# Patient Record
Sex: FEMALE | Race: WHITE | Employment: OTHER | URBAN - METROPOLITAN AREA
[De-identification: names, ages, dates, MRNs, and addresses within clinical notes are randomized per-mention and may not be internally consistent; named-entity substitution may affect disease eponyms.]

---

## 2017-01-19 ENCOUNTER — OFFICE VISIT (OUTPATIENT)
Dept: ORTHOPEDIC SURGERY | Facility: CLINIC | Age: 82
End: 2017-01-19

## 2017-01-19 VITALS
BODY MASS INDEX: 26.57 KG/M2 | SYSTOLIC BLOOD PRESSURE: 140 MMHG | DIASTOLIC BLOOD PRESSURE: 88 MMHG | TEMPERATURE: 98 F | HEART RATE: 70 BPM | WEIGHT: 150 LBS

## 2017-01-19 DIAGNOSIS — M25.511 RIGHT SHOULDER PAIN, UNSPECIFIED CHRONICITY: ICD-10-CM

## 2017-01-19 DIAGNOSIS — M25.561 PAIN IN BOTH KNEES, UNSPECIFIED CHRONICITY: ICD-10-CM

## 2017-01-19 DIAGNOSIS — M25.562 PAIN IN BOTH KNEES, UNSPECIFIED CHRONICITY: ICD-10-CM

## 2017-01-19 DIAGNOSIS — M75.51 SHOULDER BURSITIS, RIGHT: Primary | ICD-10-CM

## 2017-01-19 DIAGNOSIS — M17.0 PRIMARY OSTEOARTHRITIS OF BOTH KNEES: ICD-10-CM

## 2017-01-19 DIAGNOSIS — S72.111D: ICD-10-CM

## 2017-01-19 DIAGNOSIS — M25.551 PAIN IN RIGHT HIP: ICD-10-CM

## 2017-01-19 DIAGNOSIS — M18.0 PRIMARY OSTEOARTHRITIS OF BOTH FIRST CARPOMETACARPAL JOINTS: ICD-10-CM

## 2017-01-19 DIAGNOSIS — M54.50 BILATERAL LOW BACK PAIN WITHOUT SCIATICA, UNSPECIFIED CHRONICITY: ICD-10-CM

## 2017-01-19 RX ORDER — BUPIVACAINE HYDROCHLORIDE 2.5 MG/ML
4 INJECTION, SOLUTION EPIDURAL; INFILTRATION; INTRACAUDAL ONCE
Qty: 4 ML | Refills: 0
Start: 2017-01-19 | End: 2017-01-19

## 2017-01-19 RX ORDER — BETAMETHASONE SODIUM PHOSPHATE AND BETAMETHASONE ACETATE 3; 3 MG/ML; MG/ML
3 INJECTION, SUSPENSION INTRA-ARTICULAR; INTRALESIONAL; INTRAMUSCULAR; SOFT TISSUE ONCE
Qty: 0.5 ML | Refills: 0
Start: 2017-01-19 | End: 2017-01-19

## 2017-01-19 RX ORDER — BETAMETHASONE SODIUM PHOSPHATE AND BETAMETHASONE ACETATE 3; 3 MG/ML; MG/ML
6 INJECTION, SUSPENSION INTRA-ARTICULAR; INTRALESIONAL; INTRAMUSCULAR; SOFT TISSUE ONCE
Qty: 1 ML | Refills: 0
Start: 2017-01-19 | End: 2017-01-19

## 2017-01-19 RX ORDER — BUPIVACAINE HYDROCHLORIDE 2.5 MG/ML
8 INJECTION, SOLUTION EPIDURAL; INFILTRATION; INTRACAUDAL ONCE
Qty: 8 ML | Refills: 0
Start: 2017-01-19 | End: 2017-01-19

## 2017-01-19 NOTE — PATIENT INSTRUCTIONS
Knee Arthritis: Exercises  Your Care Instructions  Here are some examples of exercises for knee arthritis. Start each exercise slowly. Ease off the exercise if you start to have pain. Your doctor or physical therapist will tell you when you can start these exercises and which ones will work best for you. How to do the exercises  Knee flexion with heel slide    1. Lie on your back with your knees bent. 2. Slide your heel back by bending your affected knee as far as you can. Then hook your other foot around your ankle to help pull your heel even farther back. 3. Hold for about 6 seconds, then rest for up to 10 seconds. 4. Repeat 8 to 12 times. 5. Switch legs and repeat steps 1 through 4, even if only one knee is sore. Quad sets    1. Sit with your affected leg straight and supported on the floor or a firm bed. Place a small, rolled-up towel under your knee. Your other leg should be bent, with that foot flat on the floor. 2. Tighten the thigh muscles of your affected leg by pressing the back of your knee down into the towel. 3. Hold for about 6 seconds, then rest for up to 10 seconds. 4. Repeat 8 to 12 times. 5. Switch legs and repeat steps 1 through 4, even if only one knee is sore. Straight-leg raises to the front    1. Lie on your back with your good knee bent so that your foot rests flat on the floor. Your affected leg should be straight. Make sure that your low back has a normal curve. You should be able to slip your hand in between the floor and the small of your back, with your palm touching the floor and your back touching the back of your hand. 2. Tighten the thigh muscles in your affected leg by pressing the back of your knee flat down to the floor. Hold your knee straight. 3. Keeping the thigh muscles tight and your leg straight, lift your affected leg up so that your heel is about 12 inches off the floor. Hold for about 6 seconds, then lower slowly.   4. Relax for up to 10 seconds between repetitions. 5. Repeat 8 to 12 times. 6. Switch legs and repeat steps 1 through 5, even if only one knee is sore. Active knee flexion    1. Lie on your stomach with your knees straight. If your kneecap is uncomfortable, roll up a washcloth and put it under your leg just above your kneecap. 2. Lift the foot of your affected leg by bending the knee so that you bring the foot up toward your buttock. If this motion hurts, try it without bending your knee quite as far. This may help you avoid any painful motion. 3. Slowly move your leg up and down. 4. Repeat 8 to 12 times. 5. Switch legs and repeat steps 1 through 4, even if only one knee is sore. Quadriceps stretch (facedown)    1. Lie flat on your stomach, and rest your face on the floor. 2. Wrap a towel or belt strap around the lower part of your affected leg. Then use the towel or belt strap to slowly pull your heel toward your buttock until you feel a stretch. 3. Hold for about 15 to 30 seconds, then relax your leg against the towel or belt strap. 4. Repeat 2 to 4 times. 5. Switch legs and repeat steps 1 through 4, even if only one knee is sore. Stationary exercise bike    If you do not have a stationary exercise bike at home, you can find one to ride at your local health club or community center. 1. Adjust the height of the bike seat so that your knee is slightly bent when your leg is extended downward. If your knee hurts when the pedal reaches the top, you can raise the seat so that your knee does not bend as much. 2. Start slowly. At first, try to do 5 to 10 minutes of cycling with little to no resistance. Then increase your time and the resistance bit by bit until you can do 20 to 30 minutes without pain. 3. If you start to have pain, rest your knee until your pain gets back to the level that is normal for you. Or cycle for less time or with less effort. Follow-up care is a key part of your treatment and safety.  Be sure to make and go to all appointments, and call your doctor if you are having problems. It's also a good idea to know your test results and keep a list of the medicines you take. Where can you learn more? Go to http://gautam-ruthie.info/. Enter C159 in the search box to learn more about \"Knee Arthritis: Exercises. \"  Current as of: May 23, 2016  Content Version: 11.1  © 2006-2016 [employer]. Care instructions adapted under license by [employer] (which disclaims liability or warranty for this information). If you have questions about a medical condition or this instruction, always ask your healthcare professional. [place] [address] any warranty or liability for your use of this information. Joint Injections: Care Instructions  Your Care Instructions  Joint injections are shots into a joint, such as the knee. They may be used to put in medicines, such as pain relievers. Or they can be used to take out fluid. Sometimes the fluid is tested in a lab. This can help find the cause of a joint problem. A corticosteroid, or steroid, shot is used to reduce inflammation in tendons or joints. It is often used to treat problems such as arthritis, tendinitis, and bursitis. Steroids can be injected directly into a painful, inflamed joint. They can also help reduce inflammation of a bursa. A bursa is a sac of fluid. It cushions and lubricates areas where tendons, ligaments, skin, muscles, or bones rub against each other. A steroid shot can sometimes help with short-term pain relief when other treatments haven't worked. If steroid shots help, pain may improve for weeks or months. Follow-up care is a key part of your treatment and safety. Be sure to make and go to all appointments, and call your doctor if you are having problems. It's also a good idea to know your test results and keep a list of the medicines you take. How can you care for yourself at home?   · Put ice or a cold pack on the area for 10 to 20 minutes at a time. Put a thin cloth between the ice and your skin. · Take anti-inflammatory medicines to reduce pain, swelling, or inflammation. These include ibuprofen (Advil, Motrin) and naproxen (Aleve). Read and follow all instructions on the label. · Avoid strenuous activities for several days, especially those that put stress on the area where you got the shot. · If you have dressings over the area, keep them clean and dry. You may remove them when your doctor tells you to. When should you call for help? Call your doctor now or seek immediate medical care if:  · You have signs of infection, such as:  ¨ Increased pain, swelling, warmth, or redness. ¨ Red streaks leading from the site. ¨ Pus draining from the site. ¨ A fever. Watch closely for changes in your health, and be sure to contact your doctor if you have any problems. Where can you learn more? Go to http://gautam-ruthie.info/. Enter N616 in the search box to learn more about \"Joint Injections: Care Instructions. \"  Current as of: May 23, 2016  Content Version: 11.1  © 6477-0634 [employer] Care instructions adapted under license by [employer] (which disclaims liability or warranty for this information). If you have questions about a medical condition or this instruction, always ask your healthcare professional. [address] any warranty or liability for your use of this information.

## 2017-01-19 NOTE — PROGRESS NOTES
Patient: Prosper Carmen                MRN: [id number]       SSN: [id number]  YOB: 1933        AGE: 80 y.o. SEX: female    PCP: Santy Shultz MD  01/19/17    Chief Complaint   Patient presents with    Knee Pain     Milo    Shoulder Pain     Right     HISTORY:  Prosper Carmen is a 80 y.o. female who is seen for bilateral knee, bilateral hand, and bilateral shoulder pain. Her pains increased recently since her recent mini strokes. She is still recovering from her L humeral neck fx pinning by Dr. Fortino Polanco. She states she ruined herself while walking to a shoe store while shopping in the mall. She has difficulty walking, standing, and climbing stairs. She suffered a laceration on the side of her right calf when she cut it in her bathroom while attempting to kill a cricket on 2/27/16. She had to call 911 after the injury and her  was at the [address] [place] [address] and was not answering his phone. She states she got the \"business\" when she went to [facility]. She received a tetanus shot in her left arm. She is still recovering from her right trochanteric fracture. She underwent pinning of a left humeral neck fracture by Dr. Fortino Polanco on 7/22/16. She reports increased numbness and tingling in her hands. She was previously seen for right hip pain. She fell in [place] at a wedding injuring her right hip 3/10/16. She had a cane that matched her outfit and walked down the aisle. She then went to a different location and was using her walker. She tripped with the walker while walking across the room. She had to be helped up by several young men after falling. She reports bruising on her knees from the fall. She is also experiencing bilateral thumb pain. She has a h/o severe basal joint OA bilateral. She responded to previous cortisone injections. She also has chronic low back pain. Occupation, etc:  Ms. Laine Lacy does not sleep well due to her pains.   She states that she noted pain recently when she bent over while having lunch. She has lived in the [address] [place] [address] for 16 days. Her  is Buddy Arana. She says that her stay there has been great so far, and that it is like going from hell into heaven. She says that her sister, Malia Rowe, is now staying at [facility] on [address]. She currently lives at [address] [place] [address] after her suspected mini strokes in July of 2016. She is currently being fed through a gastrostomy tube. Last 3 Recorded Weights in this Encounter    01/19/17 1057   Weight: 150 lb (68 kg)     Body mass index is 26.57 kg/(m^2). Patient Active Problem List   Diagnosis Code    Reflux esophagitis K21.0    Dysphasia R47.02    Vertigo R42    Generalized osteoarthrosis, involving multiple sites M15.9    Urinary incontinence R32    Bipolar 2 disorder ([facility] [address] F31.81    Hyperlipidemia LDL goal < 130 E78.5    Advance directive discussed with patient Z71.89    Bilateral shoulder bursitis M75.51, M75.52    Osteoarthritis of both knees M17.0    Trochanteric bursitis of left hip M70.62    Right hip pain M25.551    Fracture of greater trochanter of right femur ([facility] [address] S72.111A    Dementia F03.90    Fracture, humerus closed S42.309A     REVIEW OF SYSTEMS: All Below are Negative except: See HPI   Constitutional: negative for fever, chills, and weight loss. Cardiovascular: negative for chest pain, claudication, leg swelling, SOB, AHMADI   Gastrointestinal: Negative for pain, N/V/C/D, Blood in stool or urine, dysuria,  hematuria, incontinence, pelvic pain. Musculoskeletal: See HPI   Neurological: Negative for dizziness and weakness. Negative for headaches, Visual changes, confusion, seizures   Phychiatric/Behavioral: Negative for depression, memory loss, substance  abuse. Extremities: Negative for hair changes, rash, or skin lesion changes.    Hematologic: Negative for bleeding problems, bruising, pallor or swollen lymph  nodes   Peripheral Vascular: No calf pain, no circulation deficits. Social History     Social History    Marital status:      Spouse name: N/A    Number of children: N/A    Years of education: N/A     Occupational History    Not on file. Social History Main Topics    Smoking status: Former Smoker     Quit date: 2/13/2006    Smokeless tobacco: Never Used    Alcohol use No    Drug use: No    Sexual activity: Not on file     Other Topics Concern    Not on file     Social History Narrative     Allergies   Allergen Reactions    Celebrex [Celecoxib] Itching    Codeine Swelling    Sulfa (Sulfonamide Antibiotics) Swelling    Tape [Adhesive] Itching     Current Outpatient Prescriptions   Medication Sig    sennosides (SENNA) 8.8 mg/5 mL syrup Take 5 mL by mouth two (2) times a day.  multivitamin, tx-iron-ca-min (THERA-M W/ IRON) 9 mg iron-400 mcg tab tablet Take 1 Tab by mouth daily.  acetaminophen (TYLENOL) 325 mg tablet 2 Tabs by Per G Tube route every six (6) hours as needed. Indications: PAIN    docusate sodium (COLACE) 100 mg capsule 1 Cap by Per G Tube route two (2) times a day.  pantoprazole (PROTONIX) 40 mg granules for oral suspension 40 mg by PEG Tube route Daily (before breakfast).  oxybutynin (DITROPAN) 5 mg/5 mL syrup 5 mL by Per G Tube route two (2) times a day.  calcium-cholecalciferol, d3, (CALCIUM 600 + D) 600-125 mg-unit tab Take  by mouth.  lidocaine 5 % topical cream Apply  to affected area two (2) times daily as needed for Itching.  HYDROmorphone (DILAUDID) 2 mg tablet 0.5 Tabs by Per G Tube route every four (4) hours as needed. Max Daily Amount: 6 mg.  lamoTRIgine (LAMICTAL) 25 mg tablet 1 Tab by Per G Tube route nightly.  bisacodyl (DULCOLAX) 10 mg suppository Insert 10 mg into rectum daily as needed.  LORazepam (ATIVAN) 0.5 mg tablet 1 Tab by Per G Tube route every twelve (12) hours as needed. Max Daily Amount: 1 mg.  Indications: ANXIETY    OLANZapine (ZYPREXA) 15 mg tablet 1 Tab by Per G Tube route nightly.  polyethylene glycol (MIRALAX) 17 gram packet 1 Packet by Per G Tube route daily as needed.  OTHER Check CBC, CMP, mg in 5 days. Results to PCP immediately     No current facility-administered medications for this visit.       PHYSICAL EXAMINATION:  Visit Vitals    /88 (BP 1 Location: Left arm, BP Patient Position: Sitting)    Pulse 70    Temp 98 °F (36.7 °C) (Oral)    Wt 150 lb (68 kg)    BMI 26.57 kg/m2     ORTHO EXAMINATION:  Examination Right shoulder Left shoulder   Skin Intact Intact   Effusion - -   Biceps deformity - -   Atrophy - -   AC joint tenderness - -   Acromial tenderness + +   Biceps tenderness - -   Forward flexion/Elevation  170   Active abduction  160   External rotation ROM 30 30   Internal rotation ROM 70 70   Apprehension - -   Impingement - -   Drop Arm Test - -   Neurovascular Intact Intact     Examination Left hip Right hip   Skin Intact Intact   External Rotation ROM 20 20   Internal Rotation ROM 10 10   Trochanteric tenderness - -   Hip flexion contracture - -   Antalgic gait - -   Trendelenberg sign - -   Lumbar tenderness - -   Straight leg raise - -   Calf tenderness - -   Neurovascular Intact Intact     Examination Left knee Right knee   Skin Intact Intact, laceration of the right calf   Range of motion 120-0 120-0   Effusion - -   Medial joint line tenderness + +   Lateral joint line tenderness + +   Popliteal tenderness - -   Osteophytes palpable - -   Rebeccas - -   Patella crepitus - -   Anterior drawer - -   Lateral laxity - -   Medial laxity - -   Varus deformity - -   Valgus deformity - -   Pretibial edema - -   Calf tenderness - -   Peripatellar tenderness    Examination Left Hand Right Hand   Skin Intact Intact   Deformity - -   Swelling - -   Tenderness +, basal joint +, basal joint   Finger flexion Full Full   Finger extension Full Full   Sensation Normal Normal   Capillary refill Normal Normal Heberden's nodes - -   Dupuytren's - -   Positive crank and grind test    PROCEDURE:  After discussing treatment options, patient's knees and right shoulder were injected with 4 cc Marcaine and 1/2 cc Celestone. Chart reviewed for the following:   Zoila Salcedo MD, have reviewed the History, Physical and updated the Allergic reactions for 1 Fredy Lansdowne performed immediately prior to start of procedure:  Zoila Salcedo MD, have performed the following reviews on Sheri Rosario prior to the start of the procedure:            * Patient was identified by name and date of birth   * Agreement on procedure being performed was verified  * Risks and Benefits explained to the patient  * Procedure site verified and marked as necessary  * Patient was positioned for comfort  * Consent was obtained     Time: 11:18 AM     Date of procedure: 1/19/2017    Procedure performed by:  Verónica Winter MD    Ms. Arana tolerated the procedure well with no complications. RADIOGRAPHS:    XR LEFT SHOULDER 7/22/16  IMPRESSION:  Humeral neck fracture with inferior subluxation of humeral head, potentially secondary to large joint effusion/hemarthrosis. XR LEFT SHOULDER 7/22/16  IMPRESSION:  Comminuted left humeral fracture at the anatomical neck. XR LEFT HUMERUS 7/22/16  IMPRESSION:  Comminuted humerus near the anatomical neck with medial and anterior displacement. XR THORACIC SPINE 7/22/16  IMPRESSION:  1. Compression deformity of the mid thoracic spine. 2. Comminuted fracture of the left humerus. XR RIGHT HIP 3/24/16  IMPRESSION:  Greater trochanter fracture, no joint space narrowing, no osteophytes present. IMPRESSION:      ICD-10-CM ICD-9-CM    1.  Shoulder bursitis, right M75.51 726.10 betamethasone (CELESTONE SOLUSPAN) 6 mg/mL injection      BETAMETHASONE ACETATE & SODIUM PHOSPHATE INJECTION 3 MG EA.      DRAIN/INJECT LARGE JOINT/BURSA      bupivacaine, PF, (MARCAINE, PF,) 0.25 % (2.5 mg/mL) injection   2. Right shoulder pain, unspecified chronicity M25.511 719.41 betamethasone (CELESTONE SOLUSPAN) 6 mg/mL injection      BETAMETHASONE ACETATE & SODIUM PHOSPHATE INJECTION 3 MG EA.      DRAIN/INJECT LARGE JOINT/BURSA      bupivacaine, PF, (MARCAINE, PF,) 0.25 % (2.5 mg/mL) injection   3. Primary osteoarthritis of both knees M17.0 715.16 betamethasone (CELESTONE SOLUSPAN) 6 mg/mL injection      BETAMETHASONE ACETATE & SODIUM PHOSPHATE INJECTION 3 MG EA.      DRAIN/INJECT LARGE JOINT/BURSA      bupivacaine, PF, (MARCAINE, PF,) 0.25 % (2.5 mg/mL) injection   4. Pain in both knees, unspecified chronicity M25.561 719.46 betamethasone (CELESTONE SOLUSPAN) 6 mg/mL injection    M25.562  BETAMETHASONE ACETATE & SODIUM PHOSPHATE INJECTION 3 MG EA.      DRAIN/INJECT LARGE JOINT/BURSA      bupivacaine, PF, (MARCAINE, PF,) 0.25 % (2.5 mg/mL) injection   5. Fracture of greater trochanter of right femur, closed, with routine healing, subsequent encounter S72.111D V54.13    6. Pain in right hip M25.551 719.45    7. Primary osteoarthritis of both first carpometacarpal joints M18.0 715.14    8. Bilateral low back pain without sciatica, unspecified chronicity M54.5 724.2 REFERRAL TO SPINE SURGERY     PLAN:  After discussing treatment options, patient's knees and right shoulder were injected with 4 cc Marcaine and 1/2 cc Celestone. There is no need for surgery at this time. She will follow up as needed. She will follow up at the spine center if back pain continues. She will follow up with Dr. Faheem May for her left shoulder pain.       Scribed by Kimbia ([address]) as dictated by Anthony Youngblood MD

## 2017-08-21 ENCOUNTER — OFFICE VISIT (OUTPATIENT)
Dept: ORTHOPEDIC SURGERY | Facility: CLINIC | Age: 82
End: 2017-08-21

## 2017-08-21 VITALS
HEIGHT: 63 IN | HEART RATE: 66 BPM | TEMPERATURE: 98.1 F | RESPIRATION RATE: 18 BRPM | SYSTOLIC BLOOD PRESSURE: 111 MMHG | DIASTOLIC BLOOD PRESSURE: 49 MMHG | OXYGEN SATURATION: 94 %

## 2017-08-21 DIAGNOSIS — M25.561 CHRONIC PAIN OF BOTH KNEES: ICD-10-CM

## 2017-08-21 DIAGNOSIS — M18.0 PRIMARY OSTEOARTHRITIS OF BOTH FIRST CARPOMETACARPAL JOINTS: ICD-10-CM

## 2017-08-21 DIAGNOSIS — M17.0 PRIMARY OSTEOARTHRITIS OF BOTH KNEES: Primary | ICD-10-CM

## 2017-08-21 DIAGNOSIS — M19.042 PRIMARY OSTEOARTHRITIS OF BOTH HANDS: ICD-10-CM

## 2017-08-21 DIAGNOSIS — G89.29 CHRONIC PAIN OF BOTH KNEES: ICD-10-CM

## 2017-08-21 DIAGNOSIS — M25.562 CHRONIC PAIN OF BOTH KNEES: ICD-10-CM

## 2017-08-21 DIAGNOSIS — M19.041 PRIMARY OSTEOARTHRITIS OF BOTH HANDS: ICD-10-CM

## 2017-08-21 RX ORDER — BETAMETHASONE SODIUM PHOSPHATE AND BETAMETHASONE ACETATE 3; 3 MG/ML; MG/ML
6 INJECTION, SUSPENSION INTRA-ARTICULAR; INTRALESIONAL; INTRAMUSCULAR; SOFT TISSUE ONCE
Qty: 1 ML | Refills: 0 | Status: CANCELLED
Start: 2017-08-21 | End: 2017-08-21

## 2017-08-21 RX ORDER — BUPIVACAINE HYDROCHLORIDE 2.5 MG/ML
8 INJECTION, SOLUTION EPIDURAL; INFILTRATION; INTRACAUDAL ONCE
Qty: 8 ML | Refills: 0 | Status: CANCELLED
Start: 2017-08-21 | End: 2017-08-21

## 2017-08-21 NOTE — PROGRESS NOTES
Patient: Obed Irene                MRN: [id number]       SSN: [id number]  YOB: 1933        AGE: 80 y.o. SEX: female    PCP: Jessica Baeza MD  08/21/17    Chief Complaint   Patient presents with    Knee Pain     Milo    Hand Pain     Milo     HISTORY:  Obed Irene is a 80 y.o. female who is seen for increased bilateral knee and hand pain and stiffness. Her pains increased recently since her recent mini strokes. She is still recovering from her L humeral neck fx pinning by Dr. Dereck Osgood. She states she ruined herself while walking to a shoe store while shopping in the mall. She has difficulty walking, standing, and climbing stairs. She suffered a laceration on the side of her right calf when she cut it in her bathroom while attempting to kill a cricket on 2/27/16. She had to call 911 after the injury and her  was at the [address] [place] [address] and was not answering his phone. She states she got the \"business\" when she went to [facility]. She received a tetanus shot in her left arm. She is still recovering from her right trochanteric fracture. She underwent pinning of a left humeral neck fracture by Dr. Dereck Osgood on 7/22/16. She has a h/o severe basal joint OA bilateral. She responded to previous cortisone injections. She also has chronic low back pain. 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 She was previously seen for right hip, bilateral hand, and bilateral shoulder pain. She fell in [place] at a wedding injuring her right hip 3/10/16. She had a cane that matched her outfit and walked down the aisle. She then went to a different location and was using her walker. She tripped with the walker while walking across the room. She had to be helped up by several young men after falling. She reports bruising on her knees from the fall.  She reports increased numbness and tingling in her hands. She denies any numbness or tingling. Pain Assessment  8/21/2017   Location of Pain Hand   Location Modifiers Left;Right   Severity of Pain 6   Quality of Pain Aching   Duration of Pain Persistent   Frequency of Pain Intermittent   Aggravating Factors Bending;Stretching;Straightening   Limiting Behavior Yes   Relieving Factors Rest   Result of Injury No     Occupation, etc:  Ms. Emerson Pina does not sleep well due to her pains. She states that she noted pain recently when she bent over while having lunch. She has lived in the [address] [place] [address] for 16 days. Her  is Buddy Arana. She says that her stay there has been great so far, and that it is like going from [facility] into [facility]. She says that her sister, Marylen Somerset, is now staying at [facility] on [address]. She currently lives at [address] [place] [address] after her suspected mini strokes in July of 2016. She is currently being fed through a gastrostomy tube. There were no vitals filed for this visit. There is no height or weight on file to calculate BMI. Patient Active Problem List   Diagnosis Code    Reflux esophagitis K21.0    Dysphasia R47.02    Vertigo R42    Generalized osteoarthrosis, involving multiple sites M15.9    Urinary incontinence R32    Bipolar 2 disorder ([address] F31.81    Hyperlipidemia LDL goal < 130 E78.5    Advance directive discussed with patient Z71.89    Bilateral shoulder bursitis M75.51, M75.52    Osteoarthritis of both knees M17.0    Trochanteric bursitis of left hip M70.62    Right hip pain M25.551    Fracture of greater trochanter of right femur ([facility] S72.111A    Dementia F03.90    Fracture, humerus closed S42.309A     REVIEW OF SYSTEMS: All Below are Negative except: See HPI   Constitutional: negative for fever, chills, and weight loss.    Cardiovascular: negative for chest pain, claudication, leg swelling, SOB, AHMADI   Gastrointestinal: Negative for pain, N/V/C/D, Blood in stool or urine, dysuria,  hematuria, incontinence, pelvic pain. Musculoskeletal: See HPI   Neurological: Negative for dizziness and weakness. Negative for headaches, Visual changes, confusion, seizures   Phychiatric/Behavioral: Negative for depression, memory loss, substance  abuse. Extremities: Negative for hair changes, rash, or skin lesion changes. Hematologic: Negative for bleeding problems, bruising, pallor or swollen lymph  nodes   Peripheral Vascular: No calf pain, no circulation deficits. Social History     Social History    Marital status:      Spouse name: N/A    Number of children: N/A    Years of education: N/A     Occupational History    Not on file. Social History Main Topics    Smoking status: Former Smoker     Quit date: 2/13/2006    Smokeless tobacco: Never Used    Alcohol use No    Drug use: No    Sexual activity: Not on file     Other Topics Concern    Not on file     Social History Narrative     Allergies   Allergen Reactions    Celebrex [Celecoxib] Itching    Codeine Swelling    Sulfa (Sulfonamide Antibiotics) Swelling    Tape [Adhesive] Itching     Current Outpatient Prescriptions   Medication Sig    calcium-cholecalciferol, d3, (CALCIUM 600 + D) 600-125 mg-unit tab Take  by mouth.  sennosides (SENNA) 8.8 mg/5 mL syrup Take 5 mL by mouth two (2) times a day.  multivitamin, tx-iron-ca-min (THERA-M W/ IRON) 9 mg iron-400 mcg tab tablet Take 1 Tab by mouth daily.  lidocaine 5 % topical cream Apply  to affected area two (2) times daily as needed for Itching.  acetaminophen (TYLENOL) 325 mg tablet 2 Tabs by Per G Tube route every six (6) hours as needed. Indications: PAIN    lamoTRIgine (LAMICTAL) 25 mg tablet 1 Tab by Per G Tube route nightly.  bisacodyl (DULCOLAX) 10 mg suppository Insert 10 mg into rectum daily as needed.  docusate sodium (COLACE) 100 mg capsule 1 Cap by Per G Tube route two (2) times a day.     LORazepam (ATIVAN) 0.5 mg tablet 1 Tab by Per G Tube route every twelve (12) hours as needed. Max Daily Amount: 1 mg. Indications: ANXIETY    OLANZapine (ZYPREXA) 15 mg tablet 1 Tab by Per G Tube route nightly.  polyethylene glycol (MIRALAX) 17 gram packet 1 Packet by Per G Tube route daily as needed.  pantoprazole (PROTONIX) 40 mg granules for oral suspension 40 mg by PEG Tube route Daily (before breakfast).  oxybutynin (DITROPAN) 5 mg/5 mL syrup 5 mL by Per G Tube route two (2) times a day.  HYDROmorphone (DILAUDID) 2 mg tablet 0.5 Tabs by Per G Tube route every four (4) hours as needed. Max Daily Amount: 6 mg.    OTHER Check CBC, CMP, mg in 5 days. Results to PCP immediately     No current facility-administered medications for this visit.       PHYSICAL EXAMINATION:  Visit Vitals    /49    Pulse 66    Temp 98.1 °F (36.7 °C) (Oral)    Resp 18    Ht 5' 3\" (1.6 m)    SpO2 94%     ORTHO EXAMINATION:  Examination Right shoulder Left shoulder   Skin Intact Intact   Effusion - -   Biceps deformity - -   Atrophy - -   AC joint tenderness - -   Acromial tenderness + +   Biceps tenderness - -   Forward flexion/Elevation  170   Active abduction  160   External rotation ROM 30 30   Internal rotation ROM 70 70   Apprehension - -   Impingement - -   Drop Arm Test - -   Neurovascular Intact Intact     Examination Left hip Right hip   Skin Intact Intact   External Rotation ROM 20 20   Internal Rotation ROM 10 10   Trochanteric tenderness - -   Hip flexion contracture - -   Antalgic gait - -   Trendelenberg sign - -   Lumbar tenderness - -   Straight leg raise - -   Calf tenderness - -   Neurovascular Intact Intact     Examination Left knee Right knee   Skin Intact Intact, laceration of the right calf   Range of motion 120-0 120-0   Effusion - -   Medial joint line tenderness + +   Lateral joint line tenderness + +   Popliteal tenderness - -   Osteophytes palpable - -   Rebeccas - -   Patella crepitus - -   Anterior drawer - -   Lateral laxity - -   Medial laxity - -   Varus deformity - -   Valgus deformity - -   Pretibial edema - -   Calf tenderness - -   Peripatellar tenderness    Examination Left Hand Right Hand   Skin Intact Intact   Deformity - -   Swelling - -   Tenderness +, basal joint +, basal joint   Finger flexion Full Full   Finger extension Full Full   Sensation Normal Normal   Capillary refill Normal Normal   Heberden's nodes + +   Dupuytren's - -   Positive crank and grind test  10 degree PIP contracture right  20 degree MCP contracture bilateral     RADIOGRAPHS:    XR LEFT SHOULDER 7/22/16  IMPRESSION:  Humeral neck fracture with inferior subluxation of humeral head, potentially secondary to large joint effusion/hemarthrosis. XR LEFT SHOULDER 7/22/16  IMPRESSION:  Comminuted left humeral fracture at the anatomical neck. XR LEFT HUMERUS 7/22/16  IMPRESSION:  Comminuted humerus near the anatomical neck with medial and anterior displacement. XR THORACIC SPINE 7/22/16  IMPRESSION:  1. Compression deformity of the mid thoracic spine. 2. Comminuted fracture of the left humerus. XR RIGHT HIP 3/24/16  IMPRESSION:  Greater trochanter fracture, no joint space narrowing, no osteophytes present. IMPRESSION:      ICD-10-CM ICD-9-CM    1. Primary osteoarthritis of both knees M17.0 715.16 REFERRAL TO OCCUPATIONAL THERAPY   2. Chronic pain of both knees M25.561 719.46     M25.562 338.29     G89.29     3. Primary osteoarthritis of both first carpometacarpal joints M18.0 715.14    4. Primary osteoarthritis of both hands M19.041 715.14 REFERRAL TO OCCUPATIONAL THERAPY    M19.042       PLAN:  There is no need for surgery at this time. She will follow up as needed. She will follow up at the spine center if back pain continues. She will follow up with Dr. Coco Sal for her left shoulder pain. She will begin hand therapy at [facility].      Scribed by Manuel Watson ([address]) as dictated by Mata Matamoros MD

## 2017-08-21 NOTE — PATIENT INSTRUCTIONS
Hand Arthritis: Exercises  Your Care Instructions  Here are some examples of exercises for hand arthritis. Start each exercise slowly. Ease off the exercise if you start to have pain. Your doctor or your physical or occupational therapist will tell you when you can start these exercises and which ones will work best for you. How to do the exercises  Tendon gliyong    In this exercise, the steps follow one another to a make a continuous movement. 1. With your affected hand, point your fingers and thumb straight up. Your wrist should be relaxed, following the line of your fingers and thumb. 2. Curl your fingers so that the top two joints in them are bent, and your fingers wrap down. Your fingertips should touch or be near the base of your fingers. Your fingers will look like a hook. 3. Make a fist by bending your knuckles. Your thumb can gently rest against your index (pointing) finger. 4. Unwind your fingers slightly so that your fingertips can touch the base of your palm. Your thumb can rest against your index finger. 5. Move back to your starting position, with your fingers and thumb pointing up. 6. Repeat the series of motions 8 to 12 times. 7. Switch hands and repeat steps 1 through 6, even if only one hand is sore. Intrinsic flexion    1. Rest your affected hand on a table and bend the large joints where your fingers connect to your hand. Keep your thumb and the other joints in your fingers straight. 2. Slowly straighten your fingers. Your wrist should be relaxed, following the line of your fingers and thumb. 3. Move back to your starting position, with your hand bent. 4. Repeat 8 to 12 times. 5. Switch hands and repeat steps 1 through 4, even if only one hand is sore. Finger extension    1. Place your affected hand flat on a table. 2. Lift and then lower one finger at a time off the table. 3. Repeat 8 to 12 times.   4. Switch hands and repeat steps 1 through 3, even if only one hand is sore.  MP extension    1. Place your good hand on a table, palm up. Put your affected hand on top of your good hand with your fingers wrapped around the thumb of your good hand like you are making a fist.  2. Slowly uncurl the joints of your affected hand where your fingers connect to your hand so that only the top two joints of your fingers are bent. Your fingers will look like a hook. 3. Move back to your starting position, with your fingers wrapped around your good thumb. 4. Repeat 8 to 12 times. 5. Switch hands and repeat steps 1 through 4, even if only one hand is sore. PIP extension (with MP extension)    1. Place your good hand on a table, palm up. Put your affected hand on top of your good hand, palm up. 2. Use the thumb and fingers of your good hand to grasp below the middle joint of one finger of your affected hand. 3. Straighten the last two joints of that finger. 4. Repeat 8 to 12 times. 5. Repeat steps 1 through 4 with each finger. 6. Switch hands and repeat steps 1 through 5, even if only one hand is sore. DIP flexion    1. With your good hand, grasp one finger of your affected hand. Your thumb will be on the top side of your finger just below the joint that is closest to your fingernail. 2. Slowly bend your affected finger only at the joint closest to your fingernail. 3. Repeat 8 to 12 times. 4. Repeat steps 1 through 3 with each finger. 5. Switch hands and repeat steps 1 through 4, even if only one hand is sore. Follow-up care is a key part of your treatment and safety. Be sure to make and go to all appointments, and call your doctor if you are having problems. It's also a good idea to know your test results and keep a list of the medicines you take. Where can you learn more? Go to http://gautam-ruthie.info/. Enter C698 in the search box to learn more about \"Hand Arthritis: Exercises. \"  Current as of: March 21, 2017  Content Version: 11.3  © 7657-3406 [employer]. Care instructions adapted under license by [employer] (which disclaims liability or warranty for this information). If you have questions about a medical condition or this instruction, always ask your healthcare professional. [address] any warranty or liability for your use of this information.

## 2018-03-13 ENCOUNTER — OFFICE VISIT (OUTPATIENT)
Dept: ORTHOPEDIC SURGERY | Facility: CLINIC | Age: 83
End: 2018-03-13

## 2018-03-13 VITALS
OXYGEN SATURATION: 92 % | HEIGHT: 63 IN | DIASTOLIC BLOOD PRESSURE: 61 MMHG | TEMPERATURE: 97.7 F | RESPIRATION RATE: 18 BRPM | HEART RATE: 69 BPM | SYSTOLIC BLOOD PRESSURE: 128 MMHG

## 2018-03-13 DIAGNOSIS — M54.50 BILATERAL LOW BACK PAIN WITHOUT SCIATICA, UNSPECIFIED CHRONICITY: ICD-10-CM

## 2018-03-13 DIAGNOSIS — M22.42 CHONDROMALACIA OF BOTH PATELLAE: ICD-10-CM

## 2018-03-13 DIAGNOSIS — M25.561 CHRONIC PAIN OF BOTH KNEES: ICD-10-CM

## 2018-03-13 DIAGNOSIS — G89.29 CHRONIC PAIN OF BOTH KNEES: ICD-10-CM

## 2018-03-13 DIAGNOSIS — M22.41 CHONDROMALACIA OF BOTH PATELLAE: ICD-10-CM

## 2018-03-13 DIAGNOSIS — M17.0 PRIMARY OSTEOARTHRITIS OF BOTH KNEES: Primary | ICD-10-CM

## 2018-03-13 DIAGNOSIS — M25.562 CHRONIC PAIN OF BOTH KNEES: ICD-10-CM

## 2018-03-13 RX ORDER — BETAMETHASONE SODIUM PHOSPHATE AND BETAMETHASONE ACETATE 3; 3 MG/ML; MG/ML
6 INJECTION, SUSPENSION INTRA-ARTICULAR; INTRALESIONAL; INTRAMUSCULAR; SOFT TISSUE ONCE
Qty: 0.5 ML | Refills: 0
Start: 2018-03-13 | End: 2018-03-13

## 2018-03-13 RX ORDER — BUPIVACAINE HYDROCHLORIDE 2.5 MG/ML
8 INJECTION, SOLUTION EPIDURAL; INFILTRATION; INTRACAUDAL ONCE
Qty: 8 ML | Refills: 0
Start: 2018-03-13 | End: 2018-03-13

## 2018-03-13 NOTE — PATIENT INSTRUCTIONS
Knee Arthritis: Exercises  Your Care Instructions  Here are some examples of exercises for knee arthritis. Start each exercise slowly. Ease off the exercise if you start to have pain. Your doctor or physical therapist will tell you when you can start these exercises and which ones will work best for you. How to do the exercises  Knee flexion with heel slide    1. Lie on your back with your knees bent. 2. Slide your heel back by bending your affected knee as far as you can. Then hook your other foot around your ankle to help pull your heel even farther back. 3. Hold for about 6 seconds, then rest for up to 10 seconds. 4. Repeat 8 to 12 times. 5. Switch legs and repeat steps 1 through 4, even if only one knee is sore. Quad sets    1. Sit with your affected leg straight and supported on the floor or a firm bed. Place a small, rolled-up towel under your knee. Your other leg should be bent, with that foot flat on the floor. 2. Tighten the thigh muscles of your affected leg by pressing the back of your knee down into the towel. 3. Hold for about 6 seconds, then rest for up to 10 seconds. 4. Repeat 8 to 12 times. 5. Switch legs and repeat steps 1 through 4, even if only one knee is sore. Straight-leg raises to the front    1. Lie on your back with your good knee bent so that your foot rests flat on the floor. Your affected leg should be straight. Make sure that your low back has a normal curve. You should be able to slip your hand in between the floor and the small of your back, with your palm touching the floor and your back touching the back of your hand. 2. Tighten the thigh muscles in your affected leg by pressing the back of your knee flat down to the floor. Hold your knee straight. 3. Keeping the thigh muscles tight and your leg straight, lift your affected leg up so that your heel is about 12 inches off the floor. Hold for about 6 seconds, then lower slowly.   4. Relax for up to 10 seconds between repetitions. 5. Repeat 8 to 12 times. 6. Switch legs and repeat steps 1 through 5, even if only one knee is sore. Active knee flexion    1. Lie on your stomach with your knees straight. If your kneecap is uncomfortable, roll up a washcloth and put it under your leg just above your kneecap. 2. Lift the foot of your affected leg by bending the knee so that you bring the foot up toward your buttock. If this motion hurts, try it without bending your knee quite as far. This may help you avoid any painful motion. 3. Slowly move your leg up and down. 4. Repeat 8 to 12 times. 5. Switch legs and repeat steps 1 through 4, even if only one knee is sore. Quadriceps stretch (facedown)    1. Lie flat on your stomach, and rest your face on the floor. 2. Wrap a towel or belt strap around the lower part of your affected leg. Then use the towel or belt strap to slowly pull your heel toward your buttock until you feel a stretch. 3. Hold for about 15 to 30 seconds, then relax your leg against the towel or belt strap. 4. Repeat 2 to 4 times. 5. Switch legs and repeat steps 1 through 4, even if only one knee is sore. Stationary exercise bike    1. If you do not have a stationary exercise bike at home, you can find one to ride at your local health club or community center. 2. Adjust the height of the bike seat so that your knee is slightly bent when your leg is extended downward. If your knee hurts when the pedal reaches the top, you can raise the seat so that your knee does not bend as much. 3. Start slowly. At first, try to do 5 to 10 minutes of cycling with little to no resistance. Then increase your time and the resistance bit by bit until you can do 20 to 30 minutes without pain. 4. If you start to have pain, rest your knee until your pain gets back to the level that is normal for you. Or cycle for less time or with less effort. Follow-up care is a key part of your treatment and safety.  Be sure to make and go to all appointments, and call your doctor if you are having problems. It's also a good idea to know your test results and keep a list of the medicines you take. Where can you learn more? Go to http://gautam-ruthie.info/. Enter C159 in the search box to learn more about \"Knee Arthritis: Exercises. \"  Current as of: March 21, 2017  Content Version: 11.4  © 2006-2017 [employer]. Care instructions adapted under license by [employer] (which disclaims liability or warranty for this information). If you have questions about a medical condition or this instruction, always ask your healthcare professional. [place] [address] any warranty or liability for your use of this information. Joint Injections: Care Instructions  Your Care Instructions  Joint injections are shots into a joint, such as the knee. They may be used to put in medicines, such as pain relievers. Or they can be used to take out fluid. Sometimes the fluid is tested in a lab. This can help find the cause of a joint problem. A corticosteroid, or steroid, shot is used to reduce inflammation in tendons or joints. It is often used to treat problems such as arthritis, tendinitis, and bursitis. Steroids can be injected directly into a painful, inflamed joint. They can also help reduce inflammation of a bursa. A bursa is a sac of fluid. It cushions and lubricates areas where tendons, ligaments, skin, muscles, or bones rub against each other. A steroid shot can sometimes help with short-term pain relief when other treatments haven't worked. If steroid shots help, pain may improve for weeks or months. Follow-up care is a key part of your treatment and safety. Be sure to make and go to all appointments, and call your doctor if you are having problems. It's also a good idea to know your test results and keep a list of the medicines you take. How can you care for yourself at home?   · Put ice or a cold pack on the area for 10 to 20 minutes at a time. Put a thin cloth between the ice and your skin. · Take anti-inflammatory medicines to reduce pain, swelling, or inflammation. These include ibuprofen (Advil, Motrin) and naproxen (Aleve). Read and follow all instructions on the label. · Avoid strenuous activities for several days, especially those that put stress on the area where you got the shot. · If you have dressings over the area, keep them clean and dry. You may remove them when your doctor tells you to. When should you call for help? Call your doctor now or seek immediate medical care if:  ? · You have signs of infection, such as:  ¨ Increased pain, swelling, warmth, or redness. ¨ Red streaks leading from the site. ¨ Pus draining from the site. ¨ A fever. ? Watch closely for changes in your health, and be sure to contact your doctor if you have any problems. Where can you learn more? Go to http://gautam-ruthie.info/. Enter N616 in the search box to learn more about \"Joint Injections: Care Instructions. \"  Current as of: March 21, 2017  Content Version: 11.4  © 8990-8647 [employer]. Care instructions adapted under license by [employer] (which disclaims liability or warranty for this information). If you have questions about a medical condition or this instruction, always ask your healthcare professional. [place] [address] any warranty or liability for your use of this information.

## 2018-03-13 NOTE — PROGRESS NOTES
Patient: Greg Alaniz                MRN: [id number]       SSN: [id number]  YOB: 1933        AGE: 80 y.o. SEX: female    PCP: Ines Iniguez MD  03/13/18    Chief Complaint   Patient presents with    Knee Pain     Milo     HISTORY:  Grge Alaniz is a 80 y.o. female who is seen for increased bilateral knee and low back pain. She is still receiving physical therapy at [facility]. She states she has even been doing some walking in PT. She states her knees collapse on her when she is walking because of the pain and weakness. Her pains increased since her recent mini strokes. She is still recovering from her L humeral neck fx pinning by Dr. Scott Padilla. She states she ruined herself while walking to a shoe store while shopping in the mall. She has difficulty walking, standing, and climbing stairs. She suffered a laceration on the side of her right calf when she cut it in her bathroom while attempting to kill a cricket on 2/27/16. She received a tetanus shot in her left arm. She is still recovering from her right trochanteric fracture. She underwent pinning of a left humeral neck fracture by Dr. Scott Padilla on 7/22/16. She has a h/o severe basal joint OA bilateral. She responded to previous cortisone injections. She also has chronic low back pain. 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 She was previously seen for right hip, bilateral hand, and bilateral shoulder pain. She fell in [place] at a wedding injuring her right hip 3/10/16. She had a cane that matched her outfit and walked down the aisle. She then went to a different location and was using her walker. She tripped with the walker while walking across the room. She had to be helped up by several young men after falling. She reports bruising on her knees from the fall.  She reports increased numbness and tingling in her hands. She denies any numbness or tingling. Pain Assessment  8/21/2017   Location of Pain Hand   Location Modifiers Left;Right   Severity of Pain 6   Quality of Pain Aching   Duration of Pain Persistent   Frequency of Pain Intermittent   Aggravating Factors Bending;Stretching;Straightening   Limiting Behavior Yes   Relieving Factors Rest   Result of Injury No     Occupation, etc:  Ms. Sendy Aguilera does not sleep well due to her pains. She states that she noted pain recently when she bent over while having lunch. She is currently living at [facility]. Her  is Buddy Arana. She has two adult children- a 59year old son and 43year old daughter. She says that her stay there has been great so far, and that it is [facility] going from [facility] into [facility]. She says that her sister, Hayes Cotto, is now staying at [facility] on [address]. She currently lives at [address] [place] [address] after her suspected mini strokes in July of 2016. She is currently eating three meals a day but still has a gastrostomy tube which will be removed in the near future. There were no vitals filed for this visit. There is no height or weight on file to calculate BMI. Patient Active Problem List   Diagnosis Code    Reflux esophagitis K21.0    Dysphasia R47.02    Vertigo R42    Generalized osteoarthrosis, involving multiple sites M15.9    Urinary incontinence R32    Bipolar 2 disorder ([facility] [address] F31.81    Hyperlipidemia LDL goal < 130 E78.5    Advance directive discussed with patient Z71.89    Bilateral shoulder bursitis M75.51, M75.52    Osteoarthritis of both knees M17.0    Trochanteric bursitis of left hip M70.62    Right hip pain M25.551    Fracture of greater trochanter of right femur ([facility] [address] S72.111A    Dementia F03.90    Fracture, humerus closed S42.309A     REVIEW OF SYSTEMS: All Below are Negative except: See HPI   Constitutional: negative for fever, chills, and weight loss.    Cardiovascular: negative for chest pain, claudication, leg swelling, SOB, AHMADI   Gastrointestinal: Negative for pain, N/V/C/D, Blood in stool or urine, dysuria,  hematuria, incontinence, pelvic pain. Musculoskeletal: See HPI   Neurological: Negative for dizziness and weakness. Negative for headaches, Visual changes, confusion, seizures   Phychiatric/Behavioral: Negative for depression, memory loss, substance  abuse. Extremities: Negative for hair changes, rash, or skin lesion changes. Hematologic: Negative for bleeding problems, bruising, pallor or swollen lymph  nodes   Peripheral Vascular: No calf pain, no circulation deficits. Social History     Social History    Marital status:      Spouse name: N/A    Number of children: N/A    Years of education: N/A     Occupational History    Not on file. Social History Main Topics    Smoking status: Former Smoker     Quit date: 2/13/2006    Smokeless tobacco: Never Used    Alcohol use No    Drug use: No    Sexual activity: Not on file     Other Topics Concern    Not on file     Social History Narrative     Allergies   Allergen Reactions    Celebrex [Celecoxib] Itching    Codeine Swelling    Sulfa (Sulfonamide Antibiotics) Swelling    Tape [Adhesive] Itching     Current Outpatient Prescriptions   Medication Sig    betamethasone (CELESTONE SOLUSPAN) 6 mg/mL injection 1 mL by Intra artICUlar route once for 1 dose.  bupivacaine, PF, (MARCAINE, PF,) 0.25 % (2.5 mg/mL) injection 8 mL by Intra artICUlar route once for 1 dose.  calcium-cholecalciferol, d3, (CALCIUM 600 + D) 600-125 mg-unit tab Take  by mouth.  sennosides (SENNA) 8.8 mg/5 mL syrup Take 5 mL by mouth two (2) times a day.  multivitamin, tx-iron-ca-min (THERA-M W/ IRON) 9 mg iron-400 mcg tab tablet Take 1 Tab by mouth daily.  lidocaine 5 % topical cream Apply  to affected area two (2) times daily as needed for Itching.     acetaminophen (TYLENOL) 325 mg tablet 2 Tabs by Per G Tube route every six (6) hours as needed. Indications: PAIN    lamoTRIgine (LAMICTAL) 25 mg tablet 1 Tab by Per G Tube route nightly.  bisacodyl (DULCOLAX) 10 mg suppository Insert 10 mg into rectum daily as needed.  docusate sodium (COLACE) 100 mg capsule 1 Cap by Per G Tube route two (2) times a day.  OLANZapine (ZYPREXA) 15 mg tablet 1 Tab by Per G Tube route nightly.  polyethylene glycol (MIRALAX) 17 gram packet 1 Packet by Per G Tube route daily as needed.  pantoprazole (PROTONIX) 40 mg granules for oral suspension 40 mg by PEG Tube route Daily (before breakfast).  oxybutynin (DITROPAN) 5 mg/5 mL syrup 5 mL by Per G Tube route two (2) times a day.  OTHER Check CBC, CMP, mg in 5 days. Results to PCP immediately    HYDROmorphone (DILAUDID) 2 mg tablet 0.5 Tabs by Per G Tube route every four (4) hours as needed. Max Daily Amount: 6 mg.  LORazepam (ATIVAN) 0.5 mg tablet 1 Tab by Per G Tube route every twelve (12) hours as needed. Max Daily Amount: 1 mg. Indications: ANXIETY     No current facility-administered medications for this visit.       PHYSICAL EXAMINATION:  Visit Vitals    /61    Pulse 69    Temp 97.7 °F (36.5 °C) (Oral)    Resp 18    Ht 5' 3\" (1.6 m)    SpO2 92%     ORTHO EXAMINATION:  Examination Right shoulder Left shoulder   Skin Intact Intact   Effusion - -   Biceps deformity - -   Atrophy - -   AC joint tenderness - -   Acromial tenderness + +   Biceps tenderness - -   Forward flexion/Elevation  170   Active abduction  160   External rotation ROM 30 30   Internal rotation ROM 70 70   Apprehension - -   Impingement - -   Drop Arm Test - -   Neurovascular Intact Intact     Examination Left hip Right hip   Skin Intact Intact   External Rotation ROM 20 20   Internal Rotation ROM 10 10   Trochanteric tenderness - -   Hip flexion contracture - -   Antalgic gait - -   Trendelenberg sign - -   Lumbar tenderness - -   Straight leg raise - -   Calf tenderness - - Neurovascular Intact Intact     Examination Left knee Right knee   Skin Intact Intact   Range of motion 115-0 115-0   Effusion - -   Medial joint line tenderness + +   Lateral joint line tenderness + +   Popliteal tenderness - -   Osteophytes palpable - -   Rebeccas - -   Patella crepitus + +   Anterior drawer - -   Lateral laxity - -   Medial laxity - -   Varus deformity - -   Valgus deformity - -   Pretibial edema - -   Calf tenderness - -   Peripatellar tenderness    Examination Left Hand Right Hand   Skin Intact Intact   Deformity - -   Swelling - -   Tenderness +, basal joint +, basal joint   Finger flexion Full Full   Finger extension Full Full   Sensation Normal Normal   Capillary refill Normal Normal   Heberden's nodes + +   Dupuytren's - -   Positive crank and grind test  10 degree PIP contracture right  20 degree MCP contracture bilateral   Nonambulatory in a wheelchair    PROCEDURE: After discussing treatment options, patient's knees were injected with 4 cc Marcaine and 1/2 cc Celestone. Chart reviewed for the following:   Rigo Epperson MD, have reviewed the History, Physical and updated the Allergic reactions for 1 Fredy [place] performed immediately prior to start of procedure:  Rigo Epperson MD, have performed the following reviews on [place] [address] prior to the start of the procedure:            * Patient was identified by name and date of birth   * Agreement on procedure being performed was verified  * Risks and Benefits explained to the patient  * Procedure site verified and marked as necessary  * Patient was positioned for comfort  * Consent was obtained     Time: 10:24 AM     Date of procedure: 3/13/2018    Procedure performed by:  Jesse Guerrier MD    Ms. Arana tolerated the procedure well with no complications.     RADIOGRAPHS:    XR BILATERAL KNEE 3/13/18  IMPRESSION:  Three views - No fractures, no effusion, complete medial joint space narrowing, + osteophytes present. Severe patellofemoral narrowing. Severe medial OA bilateral.     XR LEFT SHOULDER 7/22/16  IMPRESSION:  Humeral neck fracture with inferior subluxation of humeral head, potentially secondary to large joint effusion/hemarthrosis. XR LEFT SHOULDER 7/22/16  IMPRESSION:  Comminuted left humeral fracture at the anatomical neck. XR LEFT HUMERUS 7/22/16  IMPRESSION:  Comminuted humerus near the anatomical neck with medial and anterior displacement. XR THORACIC SPINE 7/22/16  IMPRESSION:  1. Compression deformity of the mid thoracic spine. 2. Comminuted fracture of the left humerus. XR RIGHT HIP 3/24/16  IMPRESSION:  Greater trochanter fracture, no joint space narrowing, no osteophytes present. IMPRESSION:      ICD-10-CM ICD-9-CM    1. Primary osteoarthritis of both knees M17.0 715.16 betamethasone (CELESTONE SOLUSPAN) 6 mg/mL injection      BETAMETHASONE ACETATE & SODIUM PHOSPHATE INJECTION 3 MG EA.      DRAIN/INJECT LARGE JOINT/BURSA      bupivacaine, PF, (MARCAINE, PF,) 0.25 % (2.5 mg/mL) injection      AMB POC XRAY, KNEE; 1/2 VIEWS      AMB POC XRAY, KNEE; 1/2 VIEWS    severe medial   2. Chronic pain of both knees M25.561 719.46 betamethasone (CELESTONE SOLUSPAN) 6 mg/mL injection    M25.562 338.29 BETAMETHASONE ACETATE & SODIUM PHOSPHATE INJECTION 3 MG EA.    G89.29  DRAIN/INJECT LARGE JOINT/BURSA      bupivacaine, PF, (MARCAINE, PF,) 0.25 % (2.5 mg/mL) injection      AMB POC XRAY, KNEE; 1/2 VIEWS      AMB POC XRAY, KNEE; 1/2 VIEWS   3. Bilateral low back pain without sciatica, unspecified chronicity M54.5 724.2 REFERRAL TO SPINE SURGERY   4. 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 There is no need for surgery at this time. She will follow up as needed. She will follow up at the spine center if back pain continues. Continue PT at [facility]. Consultation report for [facility] was completed during today's visit.      Scribed by Sho Stewart ([facility]) as dictated by Erica Daniel MD

## 2018-05-02 ENCOUNTER — HOSPITAL ENCOUNTER (OUTPATIENT)
Dept: GENERAL RADIOLOGY | Age: 83
Discharge: HOME | End: 2018-05-02
Attending: INTERNAL MEDICINE
Payer: MEDICARE

## 2018-05-02 DIAGNOSIS — R13.12 OROPHARYNGEAL DYSPHAGIA: ICD-10-CM

## 2018-05-02 PROCEDURE — 74220 X-RAY XM ESOPHAGUS 1CNTRST: CPT

## 2018-05-02 PROCEDURE — 74011000250 HC RX REV CODE- 250: Performed by: INTERNAL MEDICINE

## 2018-05-02 PROCEDURE — 74011000255 HC RX REV CODE- 255: Performed by: INTERNAL MEDICINE

## 2018-05-02 RX ADMIN — BARIUM SULFATE 45 G: 960 POWDER, FOR SUSPENSION ORAL at 08:30

## 2018-05-02 RX ADMIN — ANTACID/ANTIFLATULENT 4 G: 380; 550; 10; 10 GRANULE, EFFERVESCENT ORAL at 08:30

## 2018-05-02 RX ADMIN — BARIUM SULFATE 45 ML: 980 POWDER, FOR SUSPENSION ORAL at 08:30

## 2018-08-09 ENCOUNTER — OFFICE VISIT (OUTPATIENT)
Dept: ORTHOPEDIC SURGERY | Facility: CLINIC | Age: 83
End: 2018-08-09

## 2018-08-09 VITALS
HEART RATE: 70 BPM | OXYGEN SATURATION: 93 % | SYSTOLIC BLOOD PRESSURE: 106 MMHG | HEIGHT: 63 IN | BODY MASS INDEX: 29.8 KG/M2 | TEMPERATURE: 96.4 F | DIASTOLIC BLOOD PRESSURE: 55 MMHG | RESPIRATION RATE: 16 BRPM | WEIGHT: 168.2 LBS

## 2018-08-09 DIAGNOSIS — M25.562 CHRONIC PAIN OF BOTH KNEES: ICD-10-CM

## 2018-08-09 DIAGNOSIS — M17.0 PRIMARY OSTEOARTHRITIS OF BOTH KNEES: Primary | ICD-10-CM

## 2018-08-09 DIAGNOSIS — M25.561 CHRONIC PAIN OF BOTH KNEES: ICD-10-CM

## 2018-08-09 DIAGNOSIS — G89.29 CHRONIC PAIN OF BOTH KNEES: ICD-10-CM

## 2018-08-09 DIAGNOSIS — M54.50 BILATERAL LOW BACK PAIN WITHOUT SCIATICA, UNSPECIFIED CHRONICITY: ICD-10-CM

## 2018-08-09 DIAGNOSIS — R32 FEMALE INCONTINENCE: ICD-10-CM

## 2018-08-09 RX ORDER — BETAMETHASONE SODIUM PHOSPHATE AND BETAMETHASONE ACETATE 3; 3 MG/ML; MG/ML
6 INJECTION, SUSPENSION INTRA-ARTICULAR; INTRALESIONAL; INTRAMUSCULAR; SOFT TISSUE ONCE
Qty: 0.5 ML | Refills: 0
Start: 2018-08-09 | End: 2018-08-09

## 2018-08-09 RX ORDER — BUPIVACAINE HYDROCHLORIDE 2.5 MG/ML
4 INJECTION, SOLUTION EPIDURAL; INFILTRATION; INTRACAUDAL ONCE
Qty: 4 ML | Refills: 0
Start: 2018-08-09 | End: 2018-08-09

## 2018-08-09 RX ORDER — TRAMADOL HYDROCHLORIDE 50 MG/1
50 TABLET ORAL
COMMUNITY

## 2018-08-09 RX ORDER — BUPIVACAINE HYDROCHLORIDE 2.5 MG/ML
8 INJECTION, SOLUTION EPIDURAL; INFILTRATION; INTRACAUDAL ONCE
Qty: 8 ML | Refills: 0
Start: 2018-08-09 | End: 2018-08-09

## 2018-08-09 NOTE — PATIENT INSTRUCTIONS
Back Pain: Care Instructions  Your Care Instructions    Back pain has many possible causes. It is often related to problems with muscles and ligaments of the back. It may also be related to problems with the nerves, discs, or bones of the back. Moving, lifting, standing, sitting, or sleeping in an awkward way can strain the back. Sometimes you don't notice the injury until later. Arthritis is another common cause of back pain. Although it may hurt a lot, back pain usually improves on its own within several weeks. Most people recover in 12 weeks or less. Using good home treatment and being careful not to stress your back can help you feel better sooner. Follow-up care is a key part of your treatment and safety. Be sure to make and go to all appointments, and call your doctor if you are having problems. It's also a good idea to know your test results and keep a list of the medicines you take. How can you care for yourself at home? · Sit or lie in positions that are most comfortable and reduce your pain. Try one of these positions when you lie down:  ¨ Lie on your back with your knees bent and supported by large pillows. ¨ Lie on the floor with your legs on the seat of a sofa or chair. Clevester Sanes on your side with your knees and hips bent and a pillow between your legs. ¨ Lie on your stomach if it does not make pain worse. · Do not sit up in bed, and avoid soft couches and twisted positions. Bed rest can help relieve pain at first, but it delays healing. Avoid bed rest after the first day of back pain. · Change positions every 30 minutes. If you must sit for long periods of time, take breaks from sitting. Get up and walk around, or lie in a comfortable position. · Try using a heating pad on a low or medium setting for 15 to 20 minutes every 2 or 3 hours. Try a warm shower in place of one session with the heating pad. · You can also try an ice pack for 10 to 15 minutes every 2 to 3 hours.  Put a thin cloth between the ice pack and your skin. · Take pain medicines exactly as directed. ¨ If the doctor gave you a prescription medicine for pain, take it as prescribed. ¨ If you are not taking a prescription pain medicine, ask your doctor if you can take an over-the-counter medicine. · Take short walks several times a day. You can start with 5 to 10 minutes, 3 or 4 times a day, and work up to longer walks. Walk on level surfaces and avoid hills and stairs until your back is better. · Return to work and other activities as soon as you can. Continued rest without activity is usually not good for your back. · To prevent future back pain, do exercises to stretch and strengthen your back and stomach. Learn how to use good posture, safe lifting techniques, and proper body mechanics. When should you call for help? Call your doctor now or seek immediate medical care if:    · You have new or worsening numbness in your legs.     · You have new or worsening weakness in your legs. (This could make it hard to stand up.)     · You lose control of your bladder or bowels.    Watch closely for changes in your health, and be sure to contact your doctor if:    · You have a fever, lose weight, or don't feel well.     · You do not get better as expected. Where can you learn more? Go to http://gautam-ruthie.info/. Enter O642 in the search box to learn more about \"Back Pain: Care Instructions. \"  Current as of: November 29, 2017  Content Version: 11.7  © 4346-2483 [employer]. Care instructions adapted under license by [employer] (which disclaims liability or warranty for this information). If you have questions about a medical condition or this instruction, always ask your healthcare professional. [place] [address] any warranty or liability for your use of this information. Learning About How to Have a Healthy Back  What causes back pain?     Back pain is often caused by overuse, strain, or injury. For example, people often hurt their backs playing sports or working in the yard, being jolted in a car accident, or lifting something too heavy. Aging plays a part too. Your bones and muscles tend to lose strength as you age, which makes injury more likely. The spongy discs between the bones of the spine (vertebrae) may suffer from wear and tear and no longer provide enough cushion between the bones. A disc that bulges or breaks open (herniated disc) can press on nerves, causing back pain. In some people, back pain is the result of arthritis, broken vertebrae caused by bone loss (osteoporosis), illness, or a spine problem. Although most people have back pain at one time or another, there are steps you can take to make it less likely. How can you have a healthy back? Reduce stress on your back through good posture  Slumping or slouching alone may not cause low back pain. But after the back has been strained or injured, bad posture can make pain worse. · Sleep in a position that maintains your back's normal curves and on a mattress that feels comfortable. Sleep on your side with a pillow between your knees, or sleep on your back with a pillow under your knees. These positions can reduce strain on your back. · Stand and sit up straight. \"Good posture\" generally means your ears, shoulders, and hips are in a straight line. · If you must stand for a long time, put one foot on a stool, ledge, or box. Switch feet every now and then. · Sit in a chair that is low enough to let you place both feet flat on the floor with both knees nearly level with your hips. If your chair or desk is too high, use a footrest to raise your knees. Place a small pillow, a rolled-up towel, or a lumbar roll in the curve of your back if you need extra support. · Try a kneeling chair, which helps tilt your hips forward. This takes pressure off your lower back. · Try sitting on an exercise ball.  It can rock from side to side, which helps keep your back loose. · When driving, keep your knees nearly level with your hips. Sit straight, and drive with both hands on the steering wheel. Your arms should be in a slightly bent position. Reduce stress on your back through careful lifting  · Squat down, bending at the hips and knees only. If you need to, put one knee to the floor and extend your other knee in front of you, bent at a right angle (half kneeling). · Press your chest straight forward. This helps keep your upper back straight while keeping a slight arch in your low back. · Hold the load as close to your body as possible, at the level of your belly button (navel). · Use your feet to change direction, taking small steps. · Lead with your hips as you change direction. Keep your shoulders in line with your hips as you move. · Set down your load carefully, squatting with your knees and hips only. Exercise and stretch your back  · Do some exercise on most days of the week, if your doctor says it is okay. You can walk, run, swim, or cycle. · Stretch your back muscles. Here are a few exercises to try:  Ro Lomeli on your back, and gently pull one bent knee to your chest. Put that foot back on the floor, and then pull the other knee to your chest.  ¨ Do pelvic tilts. Lie on your back with your knees bent. Tighten your stomach muscles. Pull your belly button (navel) in and up toward your ribs. You should feel like your back is pressing to the floor and your hips and pelvis are slightly lifting off the floor. Hold for 6 seconds while breathing smoothly. ¨ Sit with your back flat against a wall. · Keep your core muscles strong. The muscles of your back, belly (abdomen), and buttocks support your spine. ¨ Pull in your belly and imagine pulling your navel toward your spine. Hold this for 6 seconds, then relax. Remember to keep breathing normally as you tense your muscles. ¨ Do curl-ups. Always do them with your knees bent.  Keep your low back on the floor, and curl your shoulders toward your knees using a smooth, slow motion. Keep your arms folded across your chest. If this bothers your neck, try putting your hands behind your neck (not your head), with your elbows spread apart. ¨ Lie on your back with your knees bent and your feet flat on the floor. Tighten your belly muscles, and then push with your feet and raise your buttocks up a few inches. Hold this position 6 seconds as you continue to breathe normally, then lower yourself slowly to the floor. Repeat 8 to 12 times. ¨ If you like group exercise, try Pilates or yoga. These classes have poses that strengthen the core muscles. Lead a healthy lifestyle  · Stay at a healthy weight to avoid strain on your back. · Do not smoke. Smoking increases the risk of osteoporosis, which weakens the spine. If you need help quitting, talk to your doctor about stop-smoking programs and medicines. These can increase your chances of quitting for good. Where can you learn more? Go to http://gautam-ruthie.info/. Enter L315 in the search box to learn more about \"Learning About How to Have a Healthy Back. \"  Current as of: November 29, 2017  Content Version: 11.7  © 6368-7057 [employer]. Care instructions adapted under license by [employer] (which disclaims liability or warranty for this information). If you have questions about a medical condition or this instruction, always ask your healthcare professional. [place] [address] any warranty or liability for your use of this information. Knee Arthritis: Care Instructions  Your Care Instructions    Knee arthritis is a breakdown of the cartilage that cushions your knee joint. When the cartilage wears down, your bones rub against each other. This causes pain and stiffness. Knee arthritis tends to get worse with time.   Treatment for knee arthritis involves reducing pain, making the leg muscles stronger, and staying at a healthy body weight. The treatment usually does not improve the health of the cartilage, but it can reduce pain and improve how well your knee works. You can take simple measures to protect your knee joints, ease your pain, and help you stay active. Follow-up care is a key part of your treatment and safety. Be sure to make and go to all appointments, and call your doctor if you are having problems. It's also a good idea to know your test results and keep a list of the medicines you take. How can you care for yourself at home? · Know that knee arthritis will cause more pain on some days than on others. · Stay at a healthy weight. Lose weight if you are overweight. When you stand up, the pressure on your knees from every pound of body weight is multiplied four times. So if you lose 10 pounds, you will reduce the pressure on your knees by 40 pounds. · Talk to your doctor or physical therapist about exercises that will help ease joint pain. ¨ Stretch to help prevent stiffness and to prevent injury before you exercise. You may enjoy gentle forms of yoga to help keep your knee joints and muscles flexible. ¨ Walk instead of jog. ¨ Ride a bike. This makes your thigh muscles stronger and takes pressure off your knee. ¨ Wear well-fitting and comfortable shoes. ¨ Exercise in chest-deep water. This can help you exercise longer with less pain. ¨ Avoid exercises that include squatting or kneeling. They can put a lot of strain on your knees. ¨ Talk to your doctor to make sure that the exercise you do is not making the arthritis worse. · Do not sit for long periods of time. Try to walk once in a while to keep your knee from getting stiff. · Ask your doctor or physical therapist whether shoe inserts may reduce your arthritis pain. · If you can afford it, get new athletic shoes at least every year. This can help reduce the strain on your knees.   · Use a device to help you do everyday activities. ¨ A cane or walking stick can help you keep your balance when you walk. Hold the cane or walking stick in the hand opposite the painful knee. ¨ If you feel like you may fall when you walk, try using crutches or a front-wheeled walker. These can prevent falls that could cause more damage to your knee. ¨ A knee brace may help keep your knee stable and prevent pain. ¨ You also can use other things to make life easier, such as a higher toilet seat and handrails in the bathtub or shower. · Take pain medicines exactly as directed. ¨ Do not wait until you are in severe pain. You will get better results if you take it sooner. ¨ If you are not taking a prescription pain medicine, take an over-the-counter medicine such as acetaminophen (Tylenol), ibuprofen (Advil, Motrin), or naproxen (Aleve). Read and follow all instructions on the label. ¨ Do not take two or more pain medicines at the same time unless the doctor told you to. Many pain medicines have acetaminophen, which is Tylenol. Too much acetaminophen (Tylenol) can be harmful. ¨ Tell your doctor if you take a blood thinner, have diabetes, or have allergies to shellfish. · Ask your doctor if you might benefit from a shot of steroid medicine into your knee. This may provide pain relief for several months. · Many people take the supplements glucosamine and chondroitin for osteoarthritis. Some people feel they help, but the medical research does not show that they work. Talk to your doctor before you take these supplements. When should you call for help? Call your doctor now or seek immediate medical care if:    · You have sudden swelling, warmth, or pain in your knee.     · You have knee pain and a fever or rash.     · You have such bad pain that you cannot use your knee.    Watch closely for changes in your health, and be sure to contact your doctor if you have any problems. Where can you learn more?   Go to http://gautam-ruthie.info/. Enter N252 in the search box to learn more about \"Knee Arthritis: Care Instructions. \"  Current as of: October 10, 2017  Content Version: 11.7  © 9065-4992 [employer]. Care instructions adapted under license by [employer] (which disclaims liability or warranty for this information). If you have questions about a medical condition or this instruction, always ask your healthcare professional. [place] [address] any warranty or liability for your use of this information.

## 2018-08-09 NOTE — PROGRESS NOTES
Patient: Sheri Rosario                MRN: [id number]       SSN: [id number]  YOB: 1933        AGE: 80 y.o. SEX: female    PCP: Sidney Santizo MD  08/09/18    Chief Complaint   Patient presents with    Knee Pain     CARMEN KNEE PAIN, F/U APPT. HISTORY:  Sheri Rosario is a 80 y.o. female who is seen for increased bilateral knee and low back pain. She states her back pains are worse than her knee pains today. She notes mostly medial knee pains. She is still receiving physical therapy at [facility]. She states she has even been doing some walking in PT. She states her knees collapse on her when she is walking because of the pain and weakness. Her pains increased since her recent mini strokes. She is still recovering from her L humeral neck fx pinning by Dr. Joo Petty. She states she ruined herself while walking to a shoe store while shopping in the mall. She has difficulty walking, standing, and climbing stairs. She suffered a laceration on the side of her right calf when she cut it in her bathroom while attempting to kill a cricket on 2/27/16. She received a tetanus shot in her left arm. She is still recovering from her right trochanteric fracture. She underwent pinning of a left humeral neck fracture by Dr. Joo Petty on 7/22/16. She has a h/o severe basal joint OA bilateral. She responded to previous cortisone injections. She also has chronic low back pain. 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 She was previously seen for right hip, bilateral hand, and bilateral shoulder pain. She fell in [place] at a wedding injuring her right hip 3/10/16. She had a cane that matched her outfit and walked down the aisle. She then went to a different location and was using her walker. She tripped with the walker while walking across the room.   She had to be helped up by several young men after falling. She reports bruising on her knees from the fall. She reports increased numbness and tingling in her hands. She denies any numbness or tingling. Pain Assessment  8/9/2018   Location of Pain Knee   Location Modifiers Left;Right   Severity of Pain 10   Quality of Pain (No Data)   Quality of Pain Comment PT ONLY STATES THAT HER KNEE HURT, UNABLE TO GIVE A DESCRIPTION OF PAIN    Duration of Pain A few minutes   Frequency of Pain Intermittent   Aggravating Factors Walking;Standing   Limiting Behavior Yes   Relieving Factors Nothing   Result of Injury Yes   Work-Related Injury No   Type of Injury Fall     Occupation, etc:  Ms. Sonny Perez does not sleep well due to her pains. She states that she noted pain recently when she bent over while having lunch. She is currently living at [employer] Her  is Buddy Arana. She has two adult children- a 59year old son and 43year old daughter. She says that her sister, Kassie Hernandez, is now staying at [facility] on [address]. She currently lives at [address] [place] [address] after her suspected mini strokes in July of 2016. She says that her stay there has been great so far, and that it is like going from [facility] into [facility]. She recently had her gastrotomy tube removed and has been eating well. She recently enjoyed a trip to [place] in . Last 3 Recorded Weights in this Encounter    08/09/18 1029   Weight: 168 lb 3.2 oz (76.3 kg)     Body mass index is 29.8 kg/(m^2).     Patient Active Problem List   Diagnosis Code    Reflux esophagitis K21.0    Dysphasia R47.02    Vertigo R42    Generalized osteoarthrosis, involving multiple sites M15.9    Urinary incontinence R32    Bipolar 2 disorder ([facility] [address] F31.81    Hyperlipidemia LDL goal < 130 E78.5    Advance directive discussed with patient Z71.89    Bilateral shoulder bursitis M75.51, M75.52    Osteoarthritis of both knees M17.0    Trochanteric bursitis of left hip M70.62    Right hip pain M25.551    Fracture of greater trochanter of right femur ([facility] [address] S72.111A    Dementia F03.90    Fracture, humerus closed S42.309A     REVIEW OF SYSTEMS: All Below are Negative except: See HPI   Constitutional: negative for fever, chills, and weight loss. Cardiovascular: negative for chest pain, claudication, leg swelling, SOB, AHMADI   Gastrointestinal: Negative for pain, N/V/C/D, Blood in stool or urine, dysuria,  hematuria, incontinence, pelvic pain. Musculoskeletal: See HPI   Neurological: Negative for dizziness and weakness. Negative for headaches, Visual changes, confusion, seizures   Phychiatric/Behavioral: Negative for depression, memory loss, substance  abuse. Extremities: Negative for hair changes, rash, or skin lesion changes. Hematologic: Negative for bleeding problems, bruising, pallor or swollen lymph  nodes   Peripheral Vascular: No calf pain, no circulation deficits. Social History     Social History    Marital status:      Spouse name: N/A    Number of children: N/A    Years of education: N/A     Occupational History    Not on file. Social History Main Topics    Smoking status: Former Smoker     Quit date: 2/13/2006    Smokeless tobacco: Never Used    Alcohol use No    Drug use: No    Sexual activity: Not on file     Other Topics Concern    Not on file     Social History Narrative     Allergies   Allergen Reactions    Celebrex [Celecoxib] Itching    Codeine Swelling    Sulfa (Sulfonamide Antibiotics) Swelling    Tape [Adhesive] Itching     Current Outpatient Prescriptions   Medication Sig    traMADol (ULTRAM) 50 mg tablet Take 50 mg by mouth every six (6) hours as needed for Pain.  betamethasone (CELESTONE SOLUSPAN) 6 mg/mL injection 1 mL by Intra artICUlar route once for 1 dose.  bupivacaine, PF, (MARCAINE, PF,) 0.25 % (2.5 mg/mL) injection 8 mL by Intra artICUlar route once for 1 dose.     betamethasone (CELESTONE SOLUSPAN) 6 mg/mL injection 1 mL by Intra artICUlar route once for 1 dose.  bupivacaine, PF, (MARCAINE, PF,) 0.25 % (2.5 mg/mL) injection 4 mL by Intra artICUlar route once for 1 dose.  calcium-cholecalciferol, d3, (CALCIUM 600 + D) 600-125 mg-unit tab Take  by mouth.  sennosides (SENNA) 8.8 mg/5 mL syrup Take 5 mL by mouth two (2) times a day.  multivitamin, tx-iron-ca-min (THERA-M W/ IRON) 9 mg iron-400 mcg tab tablet Take 1 Tab by mouth daily.  lidocaine 5 % topical cream Apply  to affected area two (2) times daily as needed for Itching.  acetaminophen (TYLENOL) 325 mg tablet 2 Tabs by Per G Tube route every six (6) hours as needed. Indications: PAIN    lamoTRIgine (LAMICTAL) 25 mg tablet 1 Tab by Per G Tube route nightly.  bisacodyl (DULCOLAX) 10 mg suppository Insert 10 mg into rectum daily as needed.  docusate sodium (COLACE) 100 mg capsule 1 Cap by Per G Tube route two (2) times a day.  OLANZapine (ZYPREXA) 15 mg tablet 1 Tab by Per G Tube route nightly.  polyethylene glycol (MIRALAX) 17 gram packet 1 Packet by Per G Tube route daily as needed.  pantoprazole (PROTONIX) 40 mg granules for oral suspension 40 mg by PEG Tube route Daily (before breakfast).  oxybutynin (DITROPAN) 5 mg/5 mL syrup 5 mL by Per G Tube route two (2) times a day.  OTHER Check CBC, CMP, mg in 5 days. Results to PCP immediately    HYDROmorphone (DILAUDID) 2 mg tablet 0.5 Tabs by Per G Tube route every four (4) hours as needed. Max Daily Amount: 6 mg.  LORazepam (ATIVAN) 0.5 mg tablet 1 Tab by Per G Tube route every twelve (12) hours as needed. Max Daily Amount: 1 mg. Indications: ANXIETY     No current facility-administered medications for this visit.       PHYSICAL EXAMINATION:  Visit Vitals    /55    Pulse 70    Temp 96.4 °F (35.8 °C) (Oral)    Resp 16    Ht 5' 3\" (1.6 m)    Wt 168 lb 3.2 oz (76.3 kg)    SpO2 93%    BMI 29.8 kg/m2     ORTHO EXAMINATION:  Examination Right shoulder Left shoulder Skin Intact Intact   Effusion - -   Biceps deformity - -   Atrophy - -   AC joint tenderness - -   Acromial tenderness + +   Biceps tenderness - -   Forward flexion/Elevation  170   Active abduction  160   External rotation ROM 30 30   Internal rotation ROM 70 70   Apprehension - -   Impingement - -   Drop Arm Test - -   Neurovascular Intact Intact     Examination Left hip Right hip   Skin Intact Intact   External Rotation ROM 20 20   Internal Rotation ROM 10 10   Trochanteric tenderness - -   Hip flexion contracture - -   Antalgic gait - -   Trendelenberg sign - -   Lumbar tenderness - -   Straight leg raise - -   Calf tenderness - -   Neurovascular Intact Intact     Examination Left knee Right knee   Skin Intact Intact   Range of motion 115-0 115-0   Effusion - -   Medial joint line tenderness + +   Lateral joint line tenderness + +   Popliteal tenderness - -   Osteophytes palpable - -   Rebeccas - -   Patella crepitus + +   Anterior drawer - -   Lateral laxity - -   Medial laxity - -   Varus deformity - -   Valgus deformity - -   Pretibial edema - -   Calf tenderness - -   Peripatellar tenderness    Examination Left Hand Right Hand   Skin Intact Intact   Deformity - -   Swelling - -   Tenderness +, basal joint +, basal joint   Finger flexion Full Full   Finger extension Full Full   Sensation Normal Normal   Capillary refill Normal Normal   Heberden's nodes + +   Dupuytren's - -   Positive crank and grind test  10 degree PIP contracture right  20 degree MCP contracture bilateral   Nonambulatory in a wheelchair  Can stand supporting herself against the exam table    Examination Lumbar Thoracic   Skin Intact Intact   Tenderness + bilateral paralumbar -   Tightness + paralumbar -   Lordosis Normal N/A   Kyphosis N/A Normal   Scoliosis - -   Flexion Fingertips to knees N/A   Extension 10 N/A   Knee reflexes Normal N/A   Ankle reflexes Normal N/A   Straight leg raise - N/A   Calf tenderness - N/A Wearing aspercream patches lower back  Wearing adult diapers    PROCEDURE: After discussing treatment options, patient's knees and paralumbar regions were injected with 4 cc Marcaine and 1/2 cc Celestone. Chart reviewed for the following:   Saman Dorsey MD, have reviewed the History, Physical and updated the Allergic reactions for 1 Fredy Maple performed immediately prior to start of procedure:  Saman Dorsey MD, have performed the following reviews on [facility] prior to the start of the procedure:            * Patient was identified by name and date of birth   * Agreement on procedure being performed was verified  * Risks and Benefits explained to the patient  * Procedure site verified and marked as necessary  * Patient was positioned for comfort  * Consent was obtained     Time: 10:50 AM     Date of procedure: 8/9/2018    Procedure performed by:  Haley Phillips MD    Ms. Arana tolerated the procedure well with no complications. RADIOGRAPHS:    XR BILATERAL KNEE 3/13/18  IMPRESSION:  Three views - No fractures, no effusion, complete medial joint space narrowing, + osteophytes present. Severe patellofemoral narrowing. Severe medial OA bilateral.     XR LEFT SHOULDER 7/22/16  IMPRESSION:  Humeral neck fracture with inferior subluxation of humeral head, potentially secondary to large joint effusion/hemarthrosis. XR LEFT SHOULDER 7/22/16  IMPRESSION:  Comminuted left humeral fracture at the anatomical neck. XR LEFT HUMERUS 7/22/16  IMPRESSION:  Comminuted humerus near the anatomical neck with medial and anterior displacement. XR THORACIC SPINE 7/22/16  IMPRESSION:  1. Compression deformity of the mid thoracic spine. 2. Comminuted fracture of the left humerus. XR RIGHT HIP 3/24/16  IMPRESSION:  Greater trochanter fracture, no joint space narrowing, no osteophytes present. IMPRESSION:      ICD-10-CM ICD-9-CM    1.  Primary osteoarthritis of both knees M17.0 715.16 betamethasone (CELESTONE SOLUSPAN) 6 mg/mL injection      BETAMETHASONE ACETATE & SODIUM PHOSPHATE INJECTION 3 MG EA.      DRAIN/INJECT LARGE JOINT/BURSA      bupivacaine, PF, (MARCAINE, PF,) 0.25 % (2.5 mg/mL) injection   2. Chronic pain of both knees M25.561 719.46 betamethasone (CELESTONE SOLUSPAN) 6 mg/mL injection    M25.562 338.29 BETAMETHASONE ACETATE & SODIUM PHOSPHATE INJECTION 3 MG EA.    G89.29  DRAIN/INJECT LARGE JOINT/BURSA      bupivacaine, PF, (MARCAINE, PF,) 0.25 % (2.5 mg/mL) injection   3. Female incontinence R32 625.6    4. Bilateral low back pain without sciatica, unspecified chronicity M54.5 724.2 betamethasone (CELESTONE SOLUSPAN) 6 mg/mL injection      BETAMETHASONE ACETATE & SODIUM PHOSPHATE INJECTION 3 MG EA. THER/PROPH/DIAG INJECTION, SUBCUT/IM      bupivacaine, PF, (MARCAINE, PF,) 0.25 % (2.5 mg/mL) injection     PLAN: After discussing treatment options, patient's paralumbar region and knees were injected with 4 cc Marcaine and 1/2 cc Celestone. There is no need for surgery at this time. She will follow up as needed. She will follow up at the spine center if back pain continues. Continue PT at [place] [address]. [facility] was completed during today's visit.      Scribed by [facility]) as dictated by Nahomi Man MD

## 2018-12-30 ENCOUNTER — ANESTHESIA EVENT (OUTPATIENT)
Dept: ENDOSCOPY | Age: 83
End: 2018-12-30
Payer: MEDICARE

## 2018-12-31 ENCOUNTER — HOSPITAL ENCOUNTER (OUTPATIENT)
Age: 83
Setting detail: OUTPATIENT SURGERY
Discharge: HOME | End: 2018-12-31
Attending: INTERNAL MEDICINE | Admitting: INTERNAL MEDICINE
Payer: MEDICARE

## 2018-12-31 ENCOUNTER — ANESTHESIA (OUTPATIENT)
Dept: ENDOSCOPY | Age: 83
End: 2018-12-31
Payer: MEDICARE

## 2018-12-31 VITALS
HEIGHT: 63 IN | DIASTOLIC BLOOD PRESSURE: 54 MMHG | RESPIRATION RATE: 19 BRPM | OXYGEN SATURATION: 97 % | HEART RATE: 78 BPM | BODY MASS INDEX: 29.77 KG/M2 | TEMPERATURE: 97.9 F | SYSTOLIC BLOOD PRESSURE: 132 MMHG | WEIGHT: 168 LBS

## 2018-12-31 LAB
ATRIAL RATE: 58 BPM
BUN BLD-MCNC: 15 MG/DL (ref 7–18)
CALCULATED P AXIS, ECG09: 81 DEGREES
CALCULATED R AXIS, ECG10: 11 DEGREES
CALCULATED T AXIS, ECG11: 51 DEGREES
CHLORIDE BLD-SCNC: 100 MMOL/L (ref 100–108)
DIAGNOSIS, 93000: NORMAL
GLUCOSE BLD STRIP.AUTO-MCNC: 84 MG/DL (ref 74–106)
HCT VFR BLD CALC: 37 % (ref 36–49)
HGB BLD-MCNC: 12.6 G/DL (ref 12–16)
P-R INTERVAL, ECG05: 184 MS
POTASSIUM BLD-SCNC: 4.1 MMOL/L (ref 3.5–5.5)
Q-T INTERVAL, ECG07: 412 MS
QRS DURATION, ECG06: 76 MS
QTC CALCULATION (BEZET), ECG08: 404 MS
SODIUM BLD-SCNC: 143 MMOL/L (ref 136–145)
VENTRICULAR RATE, ECG03: 58 BPM

## 2018-12-31 PROCEDURE — 76040000019: Performed by: INTERNAL MEDICINE

## 2018-12-31 PROCEDURE — 77030008565 HC TBNG SUC IRR ERBE -B: Performed by: INTERNAL MEDICINE

## 2018-12-31 PROCEDURE — 74011000250 HC RX REV CODE- 250: Performed by: NURSE ANESTHETIST, CERTIFIED REGISTERED

## 2018-12-31 PROCEDURE — 77030018846 HC SOL IRR STRL H20 ICUM -A: Performed by: INTERNAL MEDICINE

## 2018-12-31 PROCEDURE — 74011250636 HC RX REV CODE- 250/636: Performed by: NURSE ANESTHETIST, CERTIFIED REGISTERED

## 2018-12-31 PROCEDURE — 93005 ELECTROCARDIOGRAM TRACING: CPT

## 2018-12-31 PROCEDURE — 77030019988 HC FCPS ENDOSC DISP BSC -B: Performed by: INTERNAL MEDICINE

## 2018-12-31 PROCEDURE — 82947 ASSAY GLUCOSE BLOOD QUANT: CPT

## 2018-12-31 PROCEDURE — 76060000031 HC ANESTHESIA FIRST 0.5 HR: Performed by: INTERNAL MEDICINE

## 2018-12-31 RX ORDER — NALOXONE HYDROCHLORIDE 0.4 MG/ML
0.1 INJECTION, SOLUTION INTRAMUSCULAR; INTRAVENOUS; SUBCUTANEOUS ONCE
Status: DISCONTINUED | OUTPATIENT
Start: 2018-12-31 | End: 2018-12-31 | Stop reason: HOSPADM

## 2018-12-31 RX ORDER — FAMOTIDINE 20 MG/1
20 TABLET, FILM COATED ORAL AS NEEDED
COMMUNITY

## 2018-12-31 RX ORDER — SODIUM CHLORIDE, SODIUM LACTATE, POTASSIUM CHLORIDE, CALCIUM CHLORIDE 600; 310; 30; 20 MG/100ML; MG/100ML; MG/100ML; MG/100ML
75 INJECTION, SOLUTION INTRAVENOUS CONTINUOUS
Status: DISCONTINUED | OUTPATIENT
Start: 2018-12-31 | End: 2018-12-31 | Stop reason: HOSPADM

## 2018-12-31 RX ORDER — SODIUM CHLORIDE 0.9 % (FLUSH) 0.9 %
5-10 SYRINGE (ML) INJECTION EVERY 8 HOURS
Status: DISCONTINUED | OUTPATIENT
Start: 2018-12-31 | End: 2018-12-31 | Stop reason: HOSPADM

## 2018-12-31 RX ORDER — SODIUM CHLORIDE 0.9 % (FLUSH) 0.9 %
5-10 SYRINGE (ML) INJECTION AS NEEDED
Status: DISCONTINUED | OUTPATIENT
Start: 2018-12-31 | End: 2018-12-31 | Stop reason: HOSPADM

## 2018-12-31 RX ORDER — INSULIN LISPRO 100 [IU]/ML
INJECTION, SOLUTION INTRAVENOUS; SUBCUTANEOUS ONCE
Status: DISCONTINUED | OUTPATIENT
Start: 2018-12-31 | End: 2018-12-31 | Stop reason: HOSPADM

## 2018-12-31 RX ORDER — FENTANYL CITRATE 50 UG/ML
50 INJECTION, SOLUTION INTRAMUSCULAR; INTRAVENOUS AS NEEDED
Status: DISCONTINUED | OUTPATIENT
Start: 2018-12-31 | End: 2018-12-31 | Stop reason: HOSPADM

## 2018-12-31 RX ORDER — FLUTICASONE PROPIONATE 50 MCG
2 SPRAY, SUSPENSION (ML) NASAL DAILY
COMMUNITY

## 2018-12-31 RX ORDER — LIDOCAINE HYDROCHLORIDE 10 MG/ML
0.1 INJECTION, SOLUTION EPIDURAL; INFILTRATION; INTRACAUDAL; PERINEURAL AS NEEDED
Status: DISCONTINUED | OUTPATIENT
Start: 2018-12-31 | End: 2018-12-31 | Stop reason: HOSPADM

## 2018-12-31 RX ORDER — LORATADINE 10 MG/1
10 TABLET ORAL DAILY
COMMUNITY

## 2018-12-31 RX ADMIN — FAMOTIDINE: 10 INJECTION INTRAVENOUS at 12:39

## 2018-12-31 RX ADMIN — SODIUM CHLORIDE, SODIUM LACTATE, POTASSIUM CHLORIDE, AND CALCIUM CHLORIDE 75 ML/HR: 600; 310; 30; 20 INJECTION, SOLUTION INTRAVENOUS at 12:33

## 2018-12-31 NOTE — PERIOP NOTES
Patient armband removed and shredded Patient confirmed by two identifiers with discharge instructions prior too being provided to patient and daughter.

## 2018-12-31 NOTE — DISCHARGE INSTRUCTIONS
Esophageal Dilation: What to Expect at [address] [place] [address]  After you have esophageal dilation, you will stay at the hospital or surgery center for 1 to 2 hours. This will allow the medicine to wear off. You will be able to go home after your doctor or nurse checks to make sure you are not having any problems. This care sheet gives you a general idea about how long it will take for you to recover. But each person recovers at a different pace. Follow the steps below to get better as quickly as possible. How can you care for yourself at home? Activity    · Rest as much as you need to after you go home.     · You should be able to go back to your usual activities the day after the procedure. Diet    · Follow your doctor's directions for eating after the procedure.     · Drink plenty of fluids (unless your doctor has told you not to). Medicines    · Your doctor will tell you if and when you can restart your medicines. He or she will also give you instructions about taking any new medicines.     · If you take blood thinners, such as warfarin (Coumadin), clopidogrel (Plavix), or aspirin, be sure to talk to your doctor. He or she will tell you if and when to start taking those medicines again. Make sure that you understand exactly what your doctor wants you to do.     · If you have a sore throat the day after the procedure, use an over-the-counter spray to numb your throat. Sucking on throat lozenges and gargling with warm salt water may also help relieve your symptoms. Follow-up care is a key part of your treatment and safety. Be sure to make and go to all appointments, and call your doctor if you are having problems. It's also a good idea to know your test results and keep a list of the medicines you take. When should you call for help? Call 911 anytime you think you may need emergency care.  For example, call if:    · You passed out (lost consciousness).     · You have trouble breathing.     · Your stools are maroon or very bloody    Call your doctor now or seek immediate medical care if:    · You have new or worse belly pain.     · You have a fever.     · You have new or more blood in your stools.     · You are sick to your stomach and cannot drink fluids.     · You cannot pass stools or gas.     · You have pain that does not get better after you take pain medicine.    Watch closely for changes in your health, and be sure to contact your doctor if:    · Your throat still hurts after a day or two.     · You do not get better as expected. Where can you learn more? Go to http://gautam-ruthie.info/. Enter G136 in the search box to learn more about \"Esophageal Dilation: What to Expect at Home. \"  Current as of: March 28, 2018  Content Version: 11.8  © 2902-7204 [employer]. Care instructions adapted under license by [employer] (which disclaims liability or warranty for this information). If you have questions about a medical condition or this instruction, always ask your healthcare professional. [place] [address] any warranty or liability for your use of this information. DISCHARGE SUMMARY from Nurse    PATIENT INSTRUCTIONS:    After general anesthesia or intravenous sedation, for 24 hours or while taking prescription Narcotics:  · Limit your activities  · Do not drive and operate hazardous machinery  · Do not make important personal or business decisions  · Do  not drink alcoholic beverages  · If you have not urinated within 8 hours after discharge, please contact your surgeon on call.     Report the following to your surgeon:  · Excessive pain, swelling, redness or odor of or around the surgical area  · Temperature over 100.5  · Nausea and vomiting lasting longer than 4 hours or if unable to take medications  · Any signs of decreased circulation or nerve impairment to extremity: change in color, persistent  numbness, tingling, coldness or increase pain  · Any questions      *  Please give a list of your current medications to your Primary Care Provider. *  Please update this list whenever your medications are discontinued, doses are      changed, or new medications (including over-the-counter products) are added. *  Please carry medication information at all times in case of emergency situations. These are general instructions for a healthy lifestyle:    No smoking/ No tobacco products/ Avoid exposure to second hand smoke  Surgeon General's Warning:  Quitting smoking now greatly reduces serious risk to your health. Obesity, smoking, and sedentary lifestyle greatly increases your risk for illness    A healthy diet, regular physical exercise & weight monitoring are important for maintaining a healthy lifestyle    You may be retaining fluid if you have a history of heart failure or if you experience any of the following symptoms:  Weight gain of 3 pounds or more overnight or 5 pounds in a week, increased swelling in our hands or feet or shortness of breath while lying flat in bed. Please call your doctor as soon as you notice any of these symptoms; do not wait until your next office visit. Recognize signs and symptoms of STROKE:    F-face looks uneven    A-arms unable to move or move unevenly    S-speech slurred or non-existent    T-time-call 911 as soon as signs and symptoms begin-DO NOT go       Back to bed or wait to see if you get better-TIME IS BRAIN. Warning Signs of HEART ATTACK     Call 911 if you have these symptoms:   Chest discomfort. Most heart attacks involve discomfort in the center of the chest that lasts more than a few minutes, or that goes away and comes back. It can feel like uncomfortable pressure, squeezing, fullness, or pain.  Discomfort in other areas of the upper body. Symptoms can include pain or discomfort in one or both arms, the back, neck, jaw, or stomach.  Shortness of breath with or without chest discomfort.    Other signs may include breaking out in a cold sweat, nausea, or lightheadedness. Don't wait more than five minutes to call 911 - MINUTES MATTER! Fast action can save your life. Calling 911 is almost always the fastest way to get lifesaving treatment. Emergency Medical Services staff can begin treatment when they arrive -- up to an hour sooner than if someone gets to the hospital by car. The discharge information has been reviewed with the patient/family. The patient/family verbalized understanding. Discharge medications reviewed with the patient/family and appropriate educational materials and side effects teaching were provided.   ___________________________________________________________________________________________________________________________________

## 2018-12-31 NOTE — PROGRESS NOTES
[email or web address] 
[phone] [address] [facility], [address] Procedure Note Aron Hernandez 2/28/1933 
[id number] Date of Procedure: 12/31/2018 Preoperative diagnosis: DIFFICULTY SWALLOWING Postoperative diagnosis: esophageal stricture dilation w 33, 36 fr savory over ther wire Type of Anesthesia: MAC (Monitored anesthesia care) Description of findings: same as post op dx Procedure: Procedure(s) with comments: 
ENDOSCOPY WITH ESOPHAGEAL DILATATION - savory 33,36 fr 
 
:  Dr. Pia Randall MD 
 
Assistant(s): Endoscopy Technician-1: Dail Claude Endoscopy RN-1: Jason Vance RN; Bradley Hendricks RN Devices/implants/grafts/tissues/prosthesis: None EBL:None Specimens: * No specimens in log * Findings: See printed and scanned procedure note Complications: None Dr. Pia Randall MD 
12/31/2018  2:16 PM

## 2018-12-31 NOTE — H&P
[email or web address] 
[phone] GASTROENTEROLOGY Pre-Procedure H and P Impression/Plan: 1. This patient is consented for an EGD for dysphagia Chief Complaint: dysphagia HPI: 
Bebeto Piña is a 80 y.o. female who is being is having an EGD for dysphagia PMH:  
Past Medical History:  
Diagnosis Date  Bilateral shoulder bursitis  Bipolar disease, chronic ([address]  DJD (degenerative joint disease)  GERD (gastroesophageal reflux disease)  History of falling  [place] (hard of hearing)  Hyperlipidemia  Left displaced femoral neck fracture ([address]  Left hip pain  Obesity  Osteoarthritis of both knees  Trochanteric bursitis of both hips PSH:  
Past Surgical History:  
Procedure Laterality Date  ERCP    
 HX CHOLECYSTECTOMY  HX HEENT    
 esophagus surgery  HX HIP REPLACEMENT    
 HX ORTHOPAEDIC    
 left hip Social HX:  
Social History Socioeconomic History  Marital status:  Spouse name: Not on file  Number of children: Not on file  Years of education: Not on file  Highest education level: Not on file Social Needs  Financial resource strain: Not on file  Food insecurity - worry: Not on file  Food insecurity - inability: Not on file  Transportation needs - medical: Not on file  Transportation needs - non-medical: Not on file Occupational History  Not on file Tobacco Use  Smoking status: Former Smoker Last attempt to quit: 2006 Years since quittin.8  Smokeless tobacco: Never Used Substance and Sexual Activity  Alcohol use: No  
 Drug use: No  
 Sexual activity: Not on file Other Topics Concern  Not on file Social History Narrative  Not on file FHX:  
History reviewed. No pertinent family history. Allergy: Allergies Allergen Reactions  Celebrex [Celecoxib] Itching  Codeine Swelling  Sulfa (Sulfonamide Antibiotics) Swelling  Tape [Adhesive] Itching Home Medications:  
 
Medications Prior to Admission Medication Sig  
 traMADol (ULTRAM) 50 mg tablet Take 50 mg by mouth every six (6) hours as needed for Pain.  calcium-cholecalciferol, d3, (CALCIUM 600 + D) 600-125 mg-unit tab Take  by mouth.  sennosides (SENNA) 8.8 mg/5 mL syrup Take 5 mL by mouth two (2) times a day.  multivitamin, tx-iron-ca-min (THERA-M W/ IRON) 9 mg iron-400 mcg tab tablet Take 1 Tab by mouth daily.  lidocaine 5 % topical cream Apply  to affected area two (2) times daily as needed for Itching.  acetaminophen (TYLENOL) 325 mg tablet 2 Tabs by Per G Tube route every six (6) hours as needed. Indications: PAIN  
 HYDROmorphone (DILAUDID) 2 mg tablet 0.5 Tabs by Per G Tube route every four (4) hours as needed. Max Daily Amount: 6 mg.  lamoTRIgine (LAMICTAL) 25 mg tablet 1 Tab by Per G Tube route nightly.  bisacodyl (DULCOLAX) 10 mg suppository Insert 10 mg into rectum daily as needed.  docusate sodium (COLACE) 100 mg capsule 1 Cap by Per G Tube route two (2) times a day.  LORazepam (ATIVAN) 0.5 mg tablet 1 Tab by Per G Tube route every twelve (12) hours as needed. Max Daily Amount: 1 mg. Indications: ANXIETY  OLANZapine (ZYPREXA) 15 mg tablet 1 Tab by Per G Tube route nightly.  polyethylene glycol (MIRALAX) 17 gram packet 1 Packet by Per G Tube route daily as needed.  pantoprazole (PROTONIX) 40 mg granules for oral suspension 40 mg by PEG Tube route Daily (before breakfast).  oxybutynin (DITROPAN) 5 mg/5 mL syrup 5 mL by Per G Tube route two (2) times a day.  OTHER Check CBC, CMP, mg in 5 days. Results to PCP immediately Review of Systems:  
 
Constitutional: No fevers, chills, weight loss, fatigue. Skin: No rashes, pruritis, jaundice, ulcerations, erythema. HENT: No headaches, nosebleeds, sinus pressure, rhinorrhea, sore throat. Eyes: No visual changes, blurred vision, eye pain, photophobia, jaundice. Cardiovascular: No chest pain, heart palpitations. Respiratory: No cough, SOB, wheezing, chest discomfort, orthopnea. Gastrointestinal:   
Genitourinary: No dysuria, bleeding, discharge, pyuria. Musculoskeletal: No weakness, arthralgias, wasting. Endo: No sweats. Heme: No bruising, easy bleeding. Allergies: As noted. Neurological: Cranial nerves intact. Alert and oriented. Gait not assessed. Psychiatric:  No anxiety, depression, hallucinations. Visit Vitals /86 Pulse 79 Temp 97.9 °F (36.6 °C) Resp 20 Ht 5' 3\" (1.6 m) Wt 76.2 kg (168 lb) SpO2 99% BMI 29.76 kg/m² Physical Assessment:  
 
constitutional: appearance: well developed, well nourished, normal habitus, no deformities, in no acute distress. skin: inspection: no rashes, ulcers, icterus or other lesions; no clubbing or telangiectasias. palpation: no induration or subcutaneos nodules. eyes: inspection: normal conjunctivae and lids; no jaundice pupils: normal 
ENMT: mouth: normal oral mucosa,lips and gums; good dentition. oropharynx: normal tongue, hard and soft palate; posterior pharynx without erithema, exudate or lesions. neck: thyroid: normal size, consistency and position; no masses or tenderness. respiratory: effort: normal chest excursion; no intercostal retraction or accessory muscle use. cardiovascular: abdominal aorta: normal size and position; no bruits. palpation: PMI of normal size and position; normal rhythm; no thrill or murmurs. abdominal: abdomen: normal consistency; no tenderness or masses. hernias: no hernias appreciated. liver: normal size and consistency. spleen: not palpable. rectal: hemoccult/guaiac: not performed. musculoskeletal: digits and nails: no clubbing, cyanosis, petechiae or other inflammatory conditions.  gait: normal gait and station head and neck: normal range of motion; no pain, crepitation or contracture. spine/ribs/pelvis: normal range of motion; no pain, deformity or contracture. neurologic: cranial nerves: II-XII normal.  
psychiatric: judgement/insight: within normal limits. memory: within normal limits for recent and remote events. mood and affect: no evidence of depression, anxiety or agitation. orientation: oriented to time, space and person. Basic Metabolic Profile No results for input(s): NA, K, CL, CO2, BUN, GLU, CA, MG, PHOS in the last 72 hours. No lab exists for component: CREAT  
  
CBC w/Diff No results for input(s): WBC, RBC, HGB, HCT, MCV, MCH, MCHC, RDW, PLT, HGBEXT, HCTEXT, PLTEXT in the last 72 hours. No lab exists for component: MPV No results for input(s): GRANS, LYMPH, EOS, PRO, MYELO, METAS, BLAST in the last 72 hours. No lab exists for component: MONO, BASO Hepatic Function No results for input(s): ALB, TP, TBILI, GPT, SGOT, AP, AML, LPSE in the last 72 hours. No lab exists for component: DBILI Coags No results for input(s): PTP, INR, APTT in the last 72 hours. No lab exists for component: INREXT Herve Swanson MD 
[facility] [address] Cell: [phone] Direct pager: [phone] [email or web address] 
[email or web address]

## 2018-12-31 NOTE — ANESTHESIA POSTPROCEDURE EVALUATION
Procedure(s): ENDOSCOPY WITH ESOPHAGEAL DILATATION. Anesthesia Post Evaluation Multimodal analgesia: multimodal analgesia used between 6 hours prior to anesthesia start to PACU discharge Patient location during evaluation: bedside Patient participation: complete - patient participated Level of consciousness: awake Pain management: adequate Airway patency: patent Anesthetic complications: no 
Cardiovascular status: stable Respiratory status: acceptable Hydration status: acceptable Post anesthesia nausea and vomiting:  controlled Visit Vitals /62 Pulse 60 Temp 36.5 °C (97.7 °F) Resp 17 Ht 5' 3\" (1.6 m) Wt 76.2 kg (168 lb) SpO2 100% BMI 29.76 kg/m²

## 2018-12-31 NOTE — ANESTHESIA PREPROCEDURE EVALUATION
Anesthetic History No history of anesthetic complications Review of Systems / Medical History Patient summary reviewed and pertinent labs reviewed Pulmonary Within defined limits Neuro/Psych Within defined limits Comments: Bipolar dz Cardiovascular Hyperlipidemia Exercise tolerance: >4 METS 
  
GI/Hepatic/Renal 
Within defined limits GERD Endo/Other Within defined limits Other Findings Comments: Lt femoral fx s/p sx, in rehab [facility] Physical Exam 
 
Airway Mallampati: II 
TM Distance: 4 - 6 cm Neck ROM: normal range of motion Mouth opening: Normal 
 
 Cardiovascular Regular rate and rhythm,  S1 and S2 normal,  no murmur, click, rub, or gallop Rhythm: regular Rate: normal 
 
 
 
 Dental 
 
Dentition: Lower dentition intact and Upper dentition intact Pulmonary Breath sounds clear to auscultation Abdominal 
GI exam deferred Other Findings Anesthetic Plan ASA: 3 Anesthesia type: MAC Induction: Intravenous Anesthetic plan and risks discussed with: Patient

## 2018-12-31 NOTE — ROUTINE PROCESS
TRANSFER - OUT REPORT: 
 
Verbal report given to Tello Solizl on Eder Carmine  being transferred to room 6 in ACU for routine progression of care Report consisted of patients Situation, Background, Assessment and  
Recommendations(SBAR). Information from the following report(s) SBAR and Procedure Summary was reviewed with the receiving nurse. Lines:  
Peripheral IV 12/31/18 Right Antecubital (Active) Site Assessment Clean, dry, & intact 12/31/2018 12:32 PM  
Phlebitis Assessment 0 12/31/2018 12:32 PM  
Infiltration Assessment 0 12/31/2018 12:32 PM  
Dressing Status Clean, dry, & intact 12/31/2018 12:32 PM  
Dressing Type Tape;Transparent 12/31/2018 12:32 PM  
Hub Color/Line Status Pink; Infusing 12/31/2018 12:32 PM  
  
 
Opportunity for questions and clarification was provided. Patient transported with: 
 [employer]

## 2019-02-15 ENCOUNTER — OFFICE VISIT (OUTPATIENT)
Dept: ORTHOPEDIC SURGERY | Facility: CLINIC | Age: 84
End: 2019-02-15

## 2019-02-15 VITALS
OXYGEN SATURATION: 97 % | RESPIRATION RATE: 18 BRPM | SYSTOLIC BLOOD PRESSURE: 112 MMHG | DIASTOLIC BLOOD PRESSURE: 62 MMHG | HEART RATE: 76 BPM | BODY MASS INDEX: 29.76 KG/M2 | TEMPERATURE: 95.8 F | HEIGHT: 63 IN

## 2019-02-15 DIAGNOSIS — M17.0 PRIMARY OSTEOARTHRITIS OF BOTH KNEES: Primary | ICD-10-CM

## 2019-02-15 DIAGNOSIS — M25.562 CHRONIC PAIN OF BOTH KNEES: ICD-10-CM

## 2019-02-15 DIAGNOSIS — G89.29 CHRONIC PAIN OF BOTH KNEES: ICD-10-CM

## 2019-02-15 DIAGNOSIS — M25.561 CHRONIC PAIN OF BOTH KNEES: ICD-10-CM

## 2019-02-15 RX ORDER — BUPIVACAINE HYDROCHLORIDE 2.5 MG/ML
8 INJECTION, SOLUTION EPIDURAL; INFILTRATION; INTRACAUDAL ONCE
Qty: 8 ML | Refills: 0
Start: 2019-02-15 | End: 2019-02-15

## 2019-02-15 RX ORDER — BETAMETHASONE SODIUM PHOSPHATE AND BETAMETHASONE ACETATE 3; 3 MG/ML; MG/ML
6 INJECTION, SUSPENSION INTRA-ARTICULAR; INTRALESIONAL; INTRAMUSCULAR; SOFT TISSUE ONCE
Qty: 0.5 ML | Refills: 0
Start: 2019-02-15 | End: 2019-02-15

## 2019-02-15 NOTE — PROGRESS NOTES
Patient: Abril Escobedo                MRN: [id number]       SSN: [id number] YOB: 1933        AGE: 80 y.o. SEX: female PCP: Donal Perkins MD 
02/15/19 Chief Complaint Patient presents with  Knee Pain Milo HISTORY:  Abril Escobedo is a 80 y.o. female who is seen for increased bilateral knee and low back pain. She notes mostly medial knee pains. She is still receiving physical therapy at [facility]. She states she has even been doing some walking in PT. She states her knees collapse on her when she is walking. Her pains increased since her mini strokes. She states she ruined herself while walking to a shoe store while shopping in the mall. She has difficulty walking, standing, and climbing stairs. She previously underwent fixation right trochanteric fracture. She underwent pinning of a left humeral neck fracture by Dr. Coco Sal on 7/22/16. She notes some neck pain which she relates to a recent sinus infection. She has a h/o severe basal joint OA bilateral. She responded to previous cortisone injections. She also has chronic low back pain. 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 She was previously seen for right hip, bilateral hand, and bilateral shoulder pain. She fell in [place] at a wedding injuring her right hip 3/10/16. She tripped with the walker while walking across the room. She had to be helped up by several young men after falling. Pain Assessment  2/15/2019 Location of Pain Knee Location Modifiers Left;Right Severity of Pain 0 Quality of Pain -  
Quality of Pain Comment - Duration of Pain - Frequency of Pain - Aggravating Factors - Limiting Behavior No  
Relieving Factors - Result of Injury - Work-Related Injury - Type of Injury - Occupation, etc:  Ms. Yousif Gonzales does not sleep well due to her pains.  She is currently an inpatient at [facility] which she states is a killing ground. She states she has been refusing her medicine since she is concerned about being poisoned. Her  is Buddy Arana. She has two adult children- a 72year old son and 48year old daughter. She a h/o bipolar disorder inially diagnosed after her first pregnancy. She says that her sister, Gilford Brought, is now staying at [facility] on [address]. She recently had her gastrotomy tube removed and has been eating well. There were no vitals filed for this visit. Body mass index is 29.76 kg/m². Patient Active Problem List  
Diagnosis Code  Reflux esophagitis K21.0  Dysphasia R47.02  Vertigo R42  Generalized osteoarthrosis, involving multiple sites M15.9  
 Urinary incontinence R32  Bipolar 2 disorder (HCC) F31.81  
 Hyperlipidemia LDL goal < 130 E78.5  Advance directive discussed with patient Z70.80  Bilateral shoulder bursitis M75.51, M75.52  
 Osteoarthritis of both knees M17.0  Trochanteric bursitis of left hip M70.62  Right hip pain M25.551  Fracture of greater trochanter of right femur ([facility] [address] S72.111A  Dementia F03.90  
 Fracture, humerus closed S42.309A REVIEW OF SYSTEMS: All Below are Negative except: See HPI Constitutional: negative for fever, chills, and weight loss. Cardiovascular: negative for chest pain, claudication, leg swelling, SOB, AHMADI Gastrointestinal: Negative for pain, N/V/C/D, Blood in stool or urine, dysuria,  hematuria, incontinence, pelvic pain. Musculoskeletal: See HPI Neurological: Negative for dizziness and weakness. Negative for headaches, Visual changes, confusion, seizures Phychiatric/Behavioral: Negative for depression, memory loss, substance  abuse. Extremities: Negative for hair changes, rash, or skin lesion changes. Hematologic: Negative for bleeding problems, bruising, pallor or swollen lymph  nodes Peripheral Vascular: No calf pain, no circulation deficits. Social History Socioeconomic History  Marital status:  Spouse name: Not on file  Number of children: Not on file  Years of education: Not on file  Highest education level: Not on file Social Needs  Financial resource strain: Not on file  Food insecurity - worry: Not on file  Food insecurity - inability: Not on file  Transportation needs - medical: Not on file  Transportation needs - non-medical: Not on file Occupational History  Not on file Tobacco Use  Smoking status: Former Smoker Last attempt to quit: 2006 Years since quittin.0  Smokeless tobacco: Never Used Substance and Sexual Activity  Alcohol use: No  
 Drug use: No  
 Sexual activity: Not on file Other Topics Concern  Not on file Social History Narrative  Not on file Allergies Allergen Reactions  Celebrex [Celecoxib] Itching  Codeine Swelling  Sulfa (Sulfonamide Antibiotics) Swelling  Tape [Adhesive] Itching Current Outpatient Medications Medication Sig  
 betamethasone (CELESTONE SOLUSPAN) 6 mg/mL injection 1 mL by Intra artICUlar route once for 1 dose.  bupivacaine, PF, (MARCAINE, PF,) 0.25 % (2.5 mg/mL) injection 8 mL by Intra artICUlar route once for 1 dose.  OMEPRAZOLE, BULK, 20 mg by Does Not Apply route.  fluticasone (FLONASE) 50 mcg/actuation nasal spray 2 Sprays by Both Nostrils route daily.  carboxymethylcellulose sodium (REFRESH OP) Apply  to eye.  traMADol (ULTRAM) 50 mg tablet Take 50 mg by mouth every six (6) hours as needed for Pain.  calcium-cholecalciferol, d3, (CALCIUM 600 + D) 600-125 mg-unit tab Take  by mouth.  acetaminophen (TYLENOL) 325 mg tablet 2 Tabs by Per G Tube route every six (6) hours as needed. Indications: PAIN  
 lamoTRIgine (LAMICTAL) 25 mg tablet 1 Tab by Per G Tube route nightly.  bisacodyl (DULCOLAX) 10 mg suppository Insert 10 mg into rectum daily as needed.  docusate sodium (COLACE) 100 mg capsule 1 Cap by Per G Tube route two (2) times a day.  polyethylene glycol (MIRALAX) 17 gram packet 1 Packet by Per G Tube route daily as needed.  GUAIFENESIN, BULK, by Does Not Apply route.  loratadine (CLARITIN) 10 mg tablet Take 10 mg by mouth daily.  famotidine (PEPCID) 20 mg tablet Take 20 mg by mouth as needed.  sennosides (SENNA) 8.8 mg/5 mL syrup Take 5 mL by mouth two (2) times a day.  multivitamin, tx-iron-ca-min (THERA-M W/ IRON) 9 mg iron-400 mcg tab tablet Take 1 Tab by mouth daily.  lidocaine 5 % topical cream Apply  to affected area two (2) times daily as needed for Itching.  HYDROmorphone (DILAUDID) 2 mg tablet 0.5 Tabs by Per G Tube route every four (4) hours as needed. Max Daily Amount: 6 mg.  LORazepam (ATIVAN) 0.5 mg tablet 1 Tab by Per G Tube route every twelve (12) hours as needed. Max Daily Amount: 1 mg. Indications: ANXIETY  OLANZapine (ZYPREXA) 15 mg tablet 1 Tab by Per G Tube route nightly.  oxybutynin (DITROPAN) 5 mg/5 mL syrup 5 mL by Per G Tube route two (2) times a day.  OTHER Check CBC, CMP, mg in 5 days. Results to PCP immediately No current facility-administered medications for this visit. PHYSICAL EXAMINATION: 
Visit Vitals /62 Pulse 76 Temp 95.8 °F (35.4 °C) (Oral) Resp 18 Ht 5' 3\" (1.6 m) SpO2 97% BMI 29.76 kg/m² ORTHO EXAMINATION: 
Examination Right shoulder Left shoulder Skin Intact Intact Effusion - - Biceps deformity - - Atrophy - -  
AC joint tenderness - - Acromial tenderness + + Biceps tenderness - - Forward flexion/Elevation  170 Active abduction  160 External rotation ROM 30 30 Internal rotation ROM 70 70 Apprehension - - Impingement - - Drop Arm Test - - Neurovascular Intact Intact Examination Left hip Right hip Skin Intact Intact External Rotation ROM 15 15 Internal Rotation ROM 10 10 Trochanteric tenderness - - Hip flexion contracture - - Antalgic gait - - Trendelenberg sign - - Lumbar tenderness - - Straight leg raise - - Calf tenderness - - Neurovascular Intact Intact Examination Left knee Right knee Skin Intact Intact Range of motion 115-0 115-0 Effusion - - Medial joint line tenderness + + Lateral joint line tenderness + + Popliteal tenderness - -  
Osteophytes palpable - - Rebeccas - - Patella crepitus + + Anterior drawer - - Lateral laxity - - Medial laxity - - Varus deformity - -  
Valgus deformity - - Pretibial edema - - Calf tenderness - - Peripatellar tenderness Examination Left Hand Right Hand Skin Intact Intact Deformity - - Swelling - - Tenderness +, basal joint +, basal joint Finger flexion Full Full Finger extension Full Full Sensation Normal Normal  
Capillary refill Normal Normal  
Heberden's nodes + + Dupuytren's - - Positive crank and grind test 
10 degree PIP contracture right 
20 degree MCP contracture bilateral  
Nonambulatory in a wheelchair Can stand supporting herself against the exam table Examination Lumbar Thoracic Skin Intact Intact Tenderness + bilateral paralumbar - Tightness + paralumbar - Lordosis Normal N/A Kyphosis N/A Normal  
Scoliosis - - Flexion Fingertips to knees N/A Extension 10 N/A Knee reflexes Normal N/A Ankle reflexes Normal N/A Straight leg raise - N/A Calf tenderness - N/A Wearing aspercream patches lower back Wearing adult diapers Chart reviewed for the following: 
 Anel Mancuso MD, have reviewed the History, Physical and updated the Allergic reactions for Izzy Wadsworth TIME OUT performed immediately prior to start of procedure: 
Anel Mancuso MD, have performed the following reviews on Izzy Wadsworth prior to the start of the procedure: * Patient was identified by name and date of birth * Agreement on procedure being performed was verified * Risks and Benefits explained to the patient * Procedure site verified and marked as necessary * Patient was positioned for comfort * Consent was obtained Time: 10:50 AM  
 
Date of procedure: 2/15/2019 Procedure performed by:  Henrry Guidry MD 
 
Ms. Arana tolerated the procedure well with no complications. RADIOGRAPHS:   
XR BILATERAL KNEE 3/13/18 IMPRESSION:  Three views - No fractures, no effusion, complete medial joint space narrowing, + osteophytes present. Severe patellofemoral narrowing. Severe medial OA bilateral.  
 
XR LEFT SHOULDER 7/22/16 IMPRESSION:  Humeral neck fracture with inferior subluxation of humeral head, potentially secondary to large joint effusion/hemarthrosis. XR LEFT SHOULDER 7/22/16 IMPRESSION:  Comminuted left humeral fracture at the anatomical neck. XR LEFT HUMERUS 7/22/16 IMPRESSION:  Comminuted humerus near the anatomical neck with medial and anterior displacement. XR THORACIC SPINE 7/22/16 IMPRESSION: 
1. Compression deformity of the mid thoracic spine. 2. Comminuted fracture of the left humerus. XR RIGHT HIP 3/24/16 IMPRESSION:  Greater trochanter fracture, no joint space narrowing, no osteophytes present. IMPRESSION:   
  ICD-10-CM ICD-9-CM 1. Primary osteoarthritis of both knees M17.0 715.16 betamethasone (CELESTONE SOLUSPAN) 6 mg/mL injection BETAMETHASONE ACETATE & SODIUM PHOSPHATE INJECTION 3 MG EA.  
   DRAIN/INJECT LARGE JOINT/BURSA  
   bupivacaine, PF, (MARCAINE, PF,) 0.25 % (2.5 mg/mL) injection 2. Chronic pain of both knees M25.561 719.46 betamethasone (CELESTONE SOLUSPAN) 6 mg/mL injection M25.562 338.29 BETAMETHASONE ACETATE & SODIUM PHOSPHATE INJECTION 3 MG EA.  
 G89.29  DRAIN/INJECT LARGE JOINT/BURSA  
   bupivacaine, PF, (MARCAINE, PF,) 0.25 % (2.5 mg/mL) injection PLAN: After discussing treatment options, patient's knees were reinjected with 4 cc Marcaine and 1/2 cc Celestone. There is no need for surgery at this time. She will follow up as needed. She will follow up at the spine center if back pain continues. Continue PT/restorative nursing care at [facility]. She will follow up with her PCP for her neck pain and sinus symptoms.    
 
Scribed by Vicky Stephens [id number]) as dictated by Luicta Ramires MD

## 2020-01-01 ENCOUNTER — OFFICE VISIT (OUTPATIENT)
Dept: ORTHOPEDIC SURGERY | Facility: CLINIC | Age: 85
End: 2020-01-01

## 2020-01-01 VITALS
BODY MASS INDEX: 29.76 KG/M2 | RESPIRATION RATE: 18 BRPM | OXYGEN SATURATION: 95 % | TEMPERATURE: 95.5 F | HEIGHT: 63 IN | DIASTOLIC BLOOD PRESSURE: 70 MMHG | SYSTOLIC BLOOD PRESSURE: 123 MMHG | HEART RATE: 69 BPM

## 2020-01-01 DIAGNOSIS — G89.29 CHRONIC LEFT SHOULDER PAIN: ICD-10-CM

## 2020-01-01 DIAGNOSIS — M25.512 CHRONIC LEFT SHOULDER PAIN: ICD-10-CM

## 2020-01-01 DIAGNOSIS — M75.02 ADHESIVE CAPSULITIS OF LEFT SHOULDER: ICD-10-CM

## 2020-01-01 DIAGNOSIS — F03.90 DEMENTIA WITHOUT BEHAVIORAL DISTURBANCE, UNSPECIFIED DEMENTIA TYPE: ICD-10-CM

## 2020-01-01 DIAGNOSIS — M19.012 PRIMARY OSTEOARTHRITIS, LEFT SHOULDER: Primary | ICD-10-CM

## 2020-01-01 DIAGNOSIS — S42.215S CLOSED NONDISPLACED FRACTURE OF SURGICAL NECK OF LEFT HUMERUS, UNSPECIFIED FRACTURE MORPHOLOGY, SEQUELA: ICD-10-CM

## 2020-01-01 RX ORDER — BETAMETHASONE SODIUM PHOSPHATE AND BETAMETHASONE ACETATE 3; 3 MG/ML; MG/ML
6 INJECTION, SUSPENSION INTRA-ARTICULAR; INTRALESIONAL; INTRAMUSCULAR; SOFT TISSUE ONCE
Qty: 0.5 ML | Refills: 0
Start: 2020-01-01 | End: 2020-01-01

## 2020-02-20 NOTE — PROGRESS NOTES
Patient: Fay Chicas                MRN: [id number]       SSN: [id number]  YOB: 1933        AGE: 80 y.o. SEX: female    PCP: Gabo Vega MD  02/20/20    Chief Complaint   Patient presents with    Arm Pain     Left    Knee Pain     Milo     HISTORY:  Fay Chicas is a 80 y.o. female who is seen for increased bilateral knee and left arm pain. She notes mostly medial knee pains. She states she has even been doing some walking in PT. She states her knees collapse on her when she is walking. Her pains increased since her mini strokes. She states she ruined herself while walking to a shoe store while shopping in the mall. She has difficulty walking, standing, and climbing stairs. She is also seen for left shoulder pain. She has been experiencing shoulder pain for the past several months. She does not recall any recent injury. She sustained an impacted left humeral neck fracture and she underwent percutaneous pinning by Dr. Phuc Nickerson 6 years ago. She feels shoulder pain with overhead activities and at night. She notes some neck pain which she relates to a recent sinus infection. She has a h/o severe basal joint OA bilateral. She responded to previous cortisone injections. She also has chronic low back pain. 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 She was previously seen for right hip, bilateral hand, and bilateral shoulder pain. She fell in [place] at a wedding injuring her right hip 3/10/16. She tripped with the walker while walking across the room.      Pain Assessment  2/20/2020   Location of Pain Knee   Location Modifiers Left;Right   Severity of Pain 5   Quality of Pain Aching   Quality of Pain Comment -   Duration of Pain Persistent   Frequency of Pain Constant   Aggravating Factors Bending;Walking;Standing   Limiting Behavior Yes   Relieving Factors Rest   Result of Injury No   Work-Related Injury -   Type of Injury -     Occupation, etc:  Ms. Carlton Meng does not sleep well due to her pains. She is currently an inpatient at the [place] [id number]. She previously was an inpatient at [facility]. She states she has been refusing her medicine since she is concerned about being poisoned. Her  is Buddy Arana. She has two adult children- a 72year old son and 48year old daughter. She a h/o bipolar disorder inially diagnosed after her first pregnancy. She says that her sister, Crow Nevarez, is now staying at [facility]. She recently had her gastrotomy tube removed and has been eating well. There were no vitals filed for this visit. Body mass index is 29.76 kg/m². Patient Active Problem List   Diagnosis Code    Reflux esophagitis K21.0    Dysphasia R47.02    Vertigo R42    Generalized osteoarthrosis, involving multiple sites M15.9    Urinary incontinence R32    Bipolar 2 disorder ([address] F31.81    Hyperlipidemia LDL goal < 130 E78.5    Advance directive discussed with patient Z71.89    Bilateral shoulder bursitis M75.51, M75.52    Osteoarthritis of both knees M17.0    Trochanteric bursitis of left hip M70.62    Right hip pain M25.551    Fracture of greater trochanter of right femur ([address] S72.111A    Dementia ([facility] [address] F03.90    Fracture, humerus closed S42.309A     REVIEW OF SYSTEMS: All Below are Negative except: See HPI   Constitutional: negative for fever, chills, and weight loss. Cardiovascular: negative for chest pain, claudication, leg swelling, SOB, AHMADI   Gastrointestinal: Negative for pain, N/V/C/D, Blood in stool or urine, dysuria,  hematuria, incontinence, pelvic pain. Musculoskeletal: See HPI   Neurological: Negative for dizziness and weakness. Negative for headaches, Visual changes, confusion, seizures   Phychiatric/Behavioral: Negative for depression, memory loss, substance  abuse.     Extremities: Negative for hair changes, rash, or skin lesion changes. Hematologic: Negative for bleeding problems, bruising, pallor or swollen lymph  nodes   Peripheral Vascular: No calf pain, no circulation deficits. Social History     Socioeconomic History    Marital status:      Spouse name: Not on file    Number of children: Not on file    Years of education: Not on file    Highest education level: Not on file   Occupational History    Not on file   Social Needs    Financial resource strain: Not on file    Food insecurity:     Worry: Not on file     Inability: Not on file    Transportation needs:     Medical: Not on file     Non-medical: Not on file   Tobacco Use    Smoking status: Former Smoker     Last attempt to quit: 2006     Years since quittin.0    Smokeless tobacco: Never Used   Substance and Sexual Activity    Alcohol use: No    Drug use: No    Sexual activity: Not on file   Lifestyle    Physical activity:     Days per week: Not on file     Minutes per session: Not on file    Stress: Not on file   Relationships    Social connections:     Talks on phone: Not on file     Gets together: Not on file     Attends Rest[employer] service: Not on file     Active member of club or organization: Not on file     Attends meetings of clubs or organizations: Not on file     Relationship status: Not on file    Intimate partner violence:     Fear of current or ex partner: Not on file     Emotionally abused: Not on file     Physically abused: Not on file     Forced sexual activity: Not on file   Other Topics Concern    Not on file   Social History Narrative    Not on file     Allergies   Allergen Reactions    Celebrex [Celecoxib] Itching    Codeine Swelling    Sulfa (Sulfonamide Antibiotics) Swelling    Tape [Adhesive] Itching     Current Outpatient Medications   Medication Sig    OMEPRAZOLE, BULK, 20 mg by Does Not Apply route.  GUAIFENESIN, BULK, by Does Not Apply route.     loratadine (CLARITIN) 10 mg tablet Take 10 mg by mouth daily.  famotidine (PEPCID) 20 mg tablet Take 20 mg by mouth as needed.  fluticasone (FLONASE) 50 mcg/actuation nasal spray 2 Sprays by Both Nostrils route daily.  carboxymethylcellulose sodium (REFRESH OP) Apply  to eye.  calcium-cholecalciferol, d3, (CALCIUM 600 + D) 600-125 mg-unit tab Take  by mouth.  sennosides (SENNA) 8.8 mg/5 mL syrup Take 5 mL by mouth two (2) times a day.  multivitamin, tx-iron-ca-min (THERA-M W/ IRON) 9 mg iron-400 mcg tab tablet Take 1 Tab by mouth daily.  lidocaine 5 % topical cream Apply  to affected area two (2) times daily as needed for Itching.  acetaminophen (TYLENOL) 325 mg tablet 2 Tabs by Per G Tube route every six (6) hours as needed. Indications: PAIN    lamoTRIgine (LAMICTAL) 25 mg tablet 1 Tab by Per G Tube route nightly.  bisacodyl (DULCOLAX) 10 mg suppository Insert 10 mg into rectum daily as needed.  docusate sodium (COLACE) 100 mg capsule 1 Cap by Per G Tube route two (2) times a day.  LORazepam (ATIVAN) 0.5 mg tablet 1 Tab by Per G Tube route every twelve (12) hours as needed. Max Daily Amount: 1 mg. Indications: ANXIETY    OLANZapine (ZYPREXA) 15 mg tablet 1 Tab by Per G Tube route nightly.  polyethylene glycol (MIRALAX) 17 gram packet 1 Packet by Per G Tube route daily as needed.  oxybutynin (DITROPAN) 5 mg/5 mL syrup 5 mL by Per G Tube route two (2) times a day.  traMADol (ULTRAM) 50 mg tablet Take 50 mg by mouth every six (6) hours as needed for Pain.  HYDROmorphone (DILAUDID) 2 mg tablet 0.5 Tabs by Per G Tube route every four (4) hours as needed. Max Daily Amount: 6 mg.    OTHER Check CBC, CMP, mg in 5 days. Results to PCP immediately     No current facility-administered medications for this visit.       PHYSICAL EXAMINATION:  Visit Vitals  /70   Pulse 69   Temp 95.5 °F (35.3 °C) (Oral)   Resp 18   Ht 5' 3\" (1.6 m)   SpO2 95%   BMI 29.76 kg/m²     ORTHO EXAMINATION:  Examination Right shoulder Left shoulder   Skin Intact Intact   Effusion - -   Biceps deformity - -   Atrophy - -   AC joint tenderness - -   Acromial tenderness + +   Biceps tenderness - -   Forward flexion/Elevation  30   Active abduction  30   External rotation ROM 30 0   Internal rotation ROM 70 20   Apprehension - -   Impingement - -   Drop Arm Test - -   Neurovascular Intact Intact   20 degree internal rotation contracture    Examination Left hip Right hip   Skin Intact Intact   External Rotation ROM 15 15   Internal Rotation ROM 10 10   Trochanteric tenderness - -   Hip flexion contracture - -   Antalgic gait - -   Trendelenberg sign - -   Lumbar tenderness - -   Straight leg raise - -   Calf tenderness - -   Neurovascular Intact Intact     Examination Left knee Right knee   Skin Intact Intact   Range of motion 115-0 115-0   Effusion - -   Medial joint line tenderness + +   Lateral joint line tenderness + +   Popliteal tenderness - -   Osteophytes palpable - -   Rebeccas - -   Patella crepitus + +   Anterior drawer - -   Lateral laxity - -   Medial laxity - -   Varus deformity - -   Valgus deformity - -   Pretibial edema - -   Calf tenderness - -   Peripatellar tenderness    Examination Left Hand Right Hand   Skin Intact Intact   Deformity - -   Swelling - -   Tenderness +, basal joint +, basal joint   Finger flexion Full Full   Finger extension Full Full   Sensation Normal Normal   Capillary refill Normal Normal   Heberden's nodes + +   Dupuytren's - -   Positive crank and grind test  10 degree PIP contracture right  20 degree MCP contracture bilateral   Nonambulatory in a wheelchair  Can stand supporting herself against the exam table    Examination Lumbar Thoracic   Skin Intact Intact   Tenderness + bilateral paralumbar -   Tightness + paralumbar -   Lordosis Normal N/A   Kyphosis N/A Normal   Scoliosis - -   Flexion Fingertips to knees N/A   Extension 10 N/A   Knee reflexes Normal N/A   Ankle reflexes Normal N/A Straight leg raise - N/A   Calf tenderness - N/A   Wearing aspercream patches lower back  Wearing adult diapers    Chart reviewed for the following:   I, Roselia Clemons MD, have reviewed the History, Physical and updated the Allergic reactions for 1 Fredy Bryant performed immediately prior to start of procedure:  Tarah Martinez MD, have performed the following reviews on Anaid Gardner prior to the start of the procedure:            * Patient was identified by name and date of birth   * Agreement on procedure being performed was verified  * Risks and Benefits explained to the patient  * Procedure site verified and marked as necessary  * Patient was positioned for comfort  * Consent was obtained     Time: 11:03 AM    Date of procedure: 2/20/2020    Procedure performed by:  Roselia Clemons MD    Ms. Arana tolerated the procedure well with no complications. RADIOGRAPHS:    XR BILATERAL KNEE 3/13/18  IMPRESSION:  Three views - No fractures, no effusion, complete medial joint space narrowing, + osteophytes present. Severe patellofemoral narrowing. Severe medial OA bilateral.     XR LEFT SHOULDER 7/22/16  IMPRESSION:  Humeral neck fracture with inferior subluxation of humeral head, potentially secondary to large joint effusion/hemarthrosis. XR LEFT SHOULDER 7/22/16  IMPRESSION:  Comminuted left humeral fracture at the anatomical neck. XR LEFT HUMERUS 7/22/16  IMPRESSION:  Comminuted humerus near the anatomical neck with medial and anterior displacement. XR THORACIC SPINE 7/22/16  IMPRESSION:  1. Compression deformity of the mid thoracic spine. 2. Comminuted fracture of the left humerus. XR RIGHT HIP 3/24/16  IMPRESSION:  Greater trochanter fracture, no joint space narrowing, no osteophytes present. IMPRESSION:      ICD-10-CM ICD-9-CM    1.  Primary osteoarthritis, left shoulder M19.012 715.11 betamethasone (CELESTONE SOLUSPAN) 6 mg/mL injection      BETAMETHASONE ACETATE & SODIUM PHOSPHATE INJECTION 3 MG EA.      DRAIN/INJECT LARGE JOINT/BURSA      REFERRAL TO PHYSICAL THERAPY   2. Chronic left shoulder pain M25.512 719.41 betamethasone (CELESTONE SOLUSPAN) 6 mg/mL injection    G89.29 338.29 BETAMETHASONE ACETATE & SODIUM PHOSPHATE INJECTION 3 MG EA.      DRAIN/INJECT LARGE JOINT/BURSA      REFERRAL TO PHYSICAL THERAPY   3. Dementia without behavioral disturbance, unspecified dementia type ([facility] [address] F03.90 294.20    4. Adhesive capsulitis of left shoulder M75.02 726.0 REFERRAL TO PHYSICAL THERAPY   5. Closed nondisplaced fracture of surgical neck of left humerus, unspecified fracture morphology, sequela S42.215S 905.2      PLAN: After discussing treatment options, patient's left shoulder was injected with 4 cc Marcaine and 1/2 cc Celestone. There is no need for surgery at this time. She will start a brief course of outpatient physical therapy. She will follow up as needed.      Scribed by Carissa Forrest (Petty Odell) as dictated by Chelo Bhardwaj MD

## 2020-02-20 NOTE — PROGRESS NOTES
Verbal order given by Dr. Tarsha Seymour to draw up 4 mL 0.25% Sensorcaine and 0.5 mL 30 mg/5mL Betamethasone.

## (undated) DEVICE — SYR 50ML SLIP TIP NSAF LF STRL --

## (undated) DEVICE — CATHETER SUCT TR FL TIP 14FR W/ O CTRL

## (undated) DEVICE — FCPS RAD JAW 4LC 240CM W/NDL -- BX/20 RADIAL JAW 4

## (undated) DEVICE — (D)GLOVE EXAM LG NITRL NS -- DISC BY MFR NO SUB

## (undated) DEVICE — BITE BLOCK ENDOSCP UNIV AD 6 TO 9.4 MM

## (undated) DEVICE — ENDOSCOPY PUMP TUBING/ CAP SET: Brand: ERBE

## (undated) DEVICE — STERILE POLYISOPRENE POWDER-FREE SURGICAL GLOVES: Brand: PROTEXIS

## (undated) DEVICE — MEDI-VAC NON-CONDUCTIVE SUCTION TUBING: Brand: CARDINAL HEALTH

## (undated) DEVICE — FLUFF AND POLYMER UNDERPAD,EXTRA HEAVY: Brand: WINGS

## (undated) DEVICE — MEDI-VAC SUCTION HIGH CAPACITY: Brand: CARDINAL HEALTH

## (undated) DEVICE — GAUZE SPONGES,16 PLY: Brand: CURITY

## (undated) DEVICE — AIRLIFE™ NASAL OXYGEN CANNULA CURVED, FLARED TIP WITH 14 FOOT (4.3 M) CRUSH-RESISTANT TUBING, OVER-THE-EAR STYLE: Brand: AIRLIFE™

## (undated) DEVICE — BASIN EMESIS 500CC ROSE 250/CS 60/PLT: Brand: MEDEGEN MEDICAL PRODUCTS, LLC

## (undated) DEVICE — SYRINGE MED 25GA 3ML L5/8IN SUBQ PLAS W/ DETACH NDL SFTY

## (undated) DEVICE — FLEX ADVANTAGE 3000CC: Brand: FLEX ADVANTAGE

## (undated) DEVICE — SOLUTION IRRIG 1000ML H2O STRL BLT